# Patient Record
Sex: FEMALE | Race: ASIAN | NOT HISPANIC OR LATINO | Employment: OTHER | URBAN - METROPOLITAN AREA
[De-identification: names, ages, dates, MRNs, and addresses within clinical notes are randomized per-mention and may not be internally consistent; named-entity substitution may affect disease eponyms.]

---

## 2023-03-27 DIAGNOSIS — M19.09 PRIMARY OSTEOARTHRITIS OF OTHER SITE: Primary | ICD-10-CM

## 2023-03-27 RX ORDER — IBUPROFEN 800 MG/1
TABLET ORAL
Qty: 90 TABLET | Refills: 0 | Status: SHIPPED | OUTPATIENT
Start: 2023-03-27 | End: 2023-09-05

## 2023-04-11 ENCOUNTER — HOSPITAL ENCOUNTER (OUTPATIENT)
Dept: DATA CONVERSION | Facility: HOSPITAL | Age: 73
End: 2023-04-11
Attending: OPHTHALMOLOGY | Admitting: OPHTHALMOLOGY
Payer: MEDICARE

## 2023-04-11 DIAGNOSIS — F41.9 ANXIETY DISORDER, UNSPECIFIED: ICD-10-CM

## 2023-04-11 DIAGNOSIS — Z85.850 PERSONAL HISTORY OF MALIGNANT NEOPLASM OF THYROID: ICD-10-CM

## 2023-04-11 DIAGNOSIS — M19.90 UNSPECIFIED OSTEOARTHRITIS, UNSPECIFIED SITE: ICD-10-CM

## 2023-04-11 DIAGNOSIS — H25.812 COMBINED FORMS OF AGE-RELATED CATARACT, LEFT EYE: ICD-10-CM

## 2023-04-11 DIAGNOSIS — Z87.891 PERSONAL HISTORY OF NICOTINE DEPENDENCE: ICD-10-CM

## 2023-04-11 DIAGNOSIS — R91.8 OTHER NONSPECIFIC ABNORMAL FINDING OF LUNG FIELD: ICD-10-CM

## 2023-04-11 DIAGNOSIS — F32.A DEPRESSION, UNSPECIFIED: ICD-10-CM

## 2023-04-11 DIAGNOSIS — Z90.710 ACQUIRED ABSENCE OF BOTH CERVIX AND UTERUS: ICD-10-CM

## 2023-04-11 DIAGNOSIS — K21.9 GASTRO-ESOPHAGEAL REFLUX DISEASE WITHOUT ESOPHAGITIS: ICD-10-CM

## 2023-04-11 DIAGNOSIS — M81.0 AGE-RELATED OSTEOPOROSIS WITHOUT CURRENT PATHOLOGICAL FRACTURE: ICD-10-CM

## 2023-04-11 DIAGNOSIS — I10 ESSENTIAL (PRIMARY) HYPERTENSION: ICD-10-CM

## 2023-06-26 ENCOUNTER — OFFICE VISIT (OUTPATIENT)
Dept: PRIMARY CARE | Facility: CLINIC | Age: 73
End: 2023-06-26
Payer: MEDICARE

## 2023-06-26 ENCOUNTER — LAB (OUTPATIENT)
Dept: LAB | Facility: LAB | Age: 73
End: 2023-06-26
Payer: MEDICARE

## 2023-06-26 VITALS
RESPIRATION RATE: 18 BRPM | BODY MASS INDEX: 26.15 KG/M2 | OXYGEN SATURATION: 98 % | WEIGHT: 147.6 LBS | SYSTOLIC BLOOD PRESSURE: 136 MMHG | DIASTOLIC BLOOD PRESSURE: 82 MMHG | HEIGHT: 63 IN | HEART RATE: 65 BPM

## 2023-06-26 DIAGNOSIS — E55.9 HYPOVITAMINOSIS D: ICD-10-CM

## 2023-06-26 DIAGNOSIS — E04.0 GOITER DIFFUSE, NONTOXIC: Primary | ICD-10-CM

## 2023-06-26 DIAGNOSIS — E04.2 NONTOXIC MULTINODULAR GOITER: ICD-10-CM

## 2023-06-26 DIAGNOSIS — F32.A DEPRESSION, UNSPECIFIED DEPRESSION TYPE: ICD-10-CM

## 2023-06-26 DIAGNOSIS — E04.0 GOITER DIFFUSE, NONTOXIC: ICD-10-CM

## 2023-06-26 PROBLEM — F41.9 ANXIETY: Status: ACTIVE | Noted: 2023-06-26

## 2023-06-26 PROBLEM — H60.543 ECZEMA OF BOTH EXTERNAL EARS: Status: RESOLVED | Noted: 2023-06-26 | Resolved: 2023-06-26

## 2023-06-26 PROBLEM — Z96.1 PSEUDOPHAKIA: Status: RESOLVED | Noted: 2023-06-26 | Resolved: 2023-06-26

## 2023-06-26 PROBLEM — M47.818 ARTHROPATHY OF RIGHT SACROILIAC JOINT: Status: ACTIVE | Noted: 2023-06-26

## 2023-06-26 PROBLEM — C73 THYROID CANCER (MULTI): Status: RESOLVED | Noted: 2023-06-26 | Resolved: 2023-06-26

## 2023-06-26 PROBLEM — J31.0 RHINITIS: Status: RESOLVED | Noted: 2023-06-26 | Resolved: 2023-06-26

## 2023-06-26 PROBLEM — R91.8 LUNG NODULES: Status: ACTIVE | Noted: 2023-06-26

## 2023-06-26 PROBLEM — R20.0 RIGHT LEG NUMBNESS: Status: RESOLVED | Noted: 2023-06-26 | Resolved: 2023-06-26

## 2023-06-26 PROBLEM — B00.1 RECURRENT COLD SORES: Status: RESOLVED | Noted: 2023-06-26 | Resolved: 2023-06-26

## 2023-06-26 PROBLEM — M81.0 OSTEOPOROSIS: Status: ACTIVE | Noted: 2023-06-26

## 2023-06-26 PROBLEM — R73.9 STRESS HYPERGLYCEMIA: Status: RESOLVED | Noted: 2023-06-26 | Resolved: 2023-06-26

## 2023-06-26 PROBLEM — H25.813 COMBINED FORM OF AGE-RELATED CATARACT, BOTH EYES: Status: ACTIVE | Noted: 2023-06-26

## 2023-06-26 PROBLEM — K86.2 PANCREATIC CYST (HHS-HCC): Status: ACTIVE | Noted: 2023-06-26

## 2023-06-26 PROBLEM — R03.0 ELEVATED BP WITHOUT DIAGNOSIS OF HYPERTENSION: Status: RESOLVED | Noted: 2023-06-26 | Resolved: 2023-06-26

## 2023-06-26 PROBLEM — R13.14 DYSPHAGIA, PHARYNGOESOPHAGEAL PHASE: Status: RESOLVED | Noted: 2023-06-26 | Resolved: 2023-06-26

## 2023-06-26 PROBLEM — I78.1 SPIDER VEINS: Status: ACTIVE | Noted: 2023-06-26

## 2023-06-26 PROBLEM — H90.3 ASYMMETRIC SNHL (SENSORINEURAL HEARING LOSS): Status: ACTIVE | Noted: 2023-06-26

## 2023-06-26 PROBLEM — M25.512 LEFT SHOULDER PAIN: Status: RESOLVED | Noted: 2023-06-26 | Resolved: 2023-06-26

## 2023-06-26 PROBLEM — M51.36 DISC DEGENERATION, LUMBAR: Status: ACTIVE | Noted: 2023-06-26

## 2023-06-26 PROBLEM — I83.893 VARICOSE VEINS OF BOTH LOWER EXTREMITIES WITH COMPLICATIONS: Status: ACTIVE | Noted: 2023-06-26

## 2023-06-26 PROBLEM — K59.09 CHRONIC CONSTIPATION: Status: ACTIVE | Noted: 2023-06-26

## 2023-06-26 PROBLEM — M51.369 DISC DEGENERATION, LUMBAR: Status: ACTIVE | Noted: 2023-06-26

## 2023-06-26 PROBLEM — M15.9 GENERALIZED OSTEOARTHRITIS: Status: ACTIVE | Noted: 2023-06-26

## 2023-06-26 PROBLEM — M54.16 LUMBAR RADICULITIS: Status: RESOLVED | Noted: 2023-06-26 | Resolved: 2023-06-26

## 2023-06-26 PROBLEM — D18.03 LIVER HEMANGIOMA: Status: ACTIVE | Noted: 2023-06-26

## 2023-06-26 PROBLEM — M16.11 PRIMARY OSTEOARTHRITIS OF RIGHT HIP: Status: RESOLVED | Noted: 2023-06-26 | Resolved: 2023-06-26

## 2023-06-26 PROBLEM — E04.1 THYROID NODULE: Status: RESOLVED | Noted: 2023-06-26 | Resolved: 2023-06-26

## 2023-06-26 PROBLEM — R25.1 TREMOR: Status: ACTIVE | Noted: 2023-06-26

## 2023-06-26 PROBLEM — R35.0 URINARY FREQUENCY: Status: RESOLVED | Noted: 2023-06-26 | Resolved: 2023-06-26

## 2023-06-26 PROBLEM — E78.5 DYSLIPIDEMIA: Status: ACTIVE | Noted: 2023-06-26

## 2023-06-26 PROBLEM — M16.9 OSTEOARTHRITIS OF HIP: Status: RESOLVED | Noted: 2023-06-26 | Resolved: 2023-06-26

## 2023-06-26 PROBLEM — C73 PAPILLARY CARCINOMA OF THYROID (MULTI): Status: RESOLVED | Noted: 2023-06-26 | Resolved: 2023-06-26

## 2023-06-26 LAB
ERYTHROCYTE DISTRIBUTION WIDTH (RATIO) BY AUTOMATED COUNT: 11.9 % (ref 11.5–14.5)
ERYTHROCYTE MEAN CORPUSCULAR HEMOGLOBIN CONCENTRATION (G/DL) BY AUTOMATED: 32.6 G/DL (ref 32–36)
ERYTHROCYTE MEAN CORPUSCULAR VOLUME (FL) BY AUTOMATED COUNT: 91 FL (ref 80–100)
ERYTHROCYTES (10*6/UL) IN BLOOD BY AUTOMATED COUNT: 4.46 X10E12/L (ref 4–5.2)
HEMATOCRIT (%) IN BLOOD BY AUTOMATED COUNT: 40.8 % (ref 36–46)
HEMOGLOBIN (G/DL) IN BLOOD: 13.3 G/DL (ref 12–16)
LEUKOCYTES (10*3/UL) IN BLOOD BY AUTOMATED COUNT: 4.8 X10E9/L (ref 4.4–11.3)
PLATELETS (10*3/UL) IN BLOOD AUTOMATED COUNT: 246 X10E9/L (ref 150–450)

## 2023-06-26 PROCEDURE — 36415 COLL VENOUS BLD VENIPUNCTURE: CPT

## 2023-06-26 PROCEDURE — 1036F TOBACCO NON-USER: CPT | Performed by: NURSE PRACTITIONER

## 2023-06-26 PROCEDURE — 82607 VITAMIN B-12: CPT

## 2023-06-26 PROCEDURE — 1160F RVW MEDS BY RX/DR IN RCRD: CPT | Performed by: NURSE PRACTITIONER

## 2023-06-26 PROCEDURE — 85027 COMPLETE CBC AUTOMATED: CPT

## 2023-06-26 PROCEDURE — 99214 OFFICE O/P EST MOD 30 MIN: CPT | Performed by: NURSE PRACTITIONER

## 2023-06-26 PROCEDURE — 80053 COMPREHEN METABOLIC PANEL: CPT

## 2023-06-26 PROCEDURE — 82306 VITAMIN D 25 HYDROXY: CPT

## 2023-06-26 PROCEDURE — 1159F MED LIST DOCD IN RCRD: CPT | Performed by: NURSE PRACTITIONER

## 2023-06-26 PROCEDURE — 84443 ASSAY THYROID STIM HORMONE: CPT

## 2023-06-26 RX ORDER — DENOSUMAB 60 MG/ML
60 INJECTION SUBCUTANEOUS
COMMUNITY
Start: 2021-05-12

## 2023-06-26 RX ORDER — HYDROXYZINE HYDROCHLORIDE 25 MG/1
25 TABLET, FILM COATED ORAL 4 TIMES DAILY PRN
COMMUNITY
End: 2023-06-29 | Stop reason: SDUPTHER

## 2023-06-26 RX ORDER — ASPIRIN 325 MG
50000 TABLET, DELAYED RELEASE (ENTERIC COATED) ORAL
COMMUNITY
End: 2024-01-26 | Stop reason: WASHOUT

## 2023-06-26 RX ORDER — FAMOTIDINE 20 MG/1
20 TABLET, FILM COATED ORAL AS NEEDED
COMMUNITY
Start: 2021-10-13 | End: 2023-06-29 | Stop reason: SDUPTHER

## 2023-06-26 RX ORDER — BUPROPION HYDROCHLORIDE 100 MG/1
100 TABLET, EXTENDED RELEASE ORAL DAILY
Qty: 30 TABLET | Refills: 2 | Status: SHIPPED | OUTPATIENT
Start: 2023-06-26 | End: 2024-02-01 | Stop reason: SDUPTHER

## 2023-06-26 NOTE — PATIENT INSTRUCTIONS
Have lab work completed - we will check your thyroid to see if that is contributing to your tiredness  Start wellbutrin once a day  Follow up in 2 months  Follow up with ENT

## 2023-06-26 NOTE — PROGRESS NOTES
"Subjective   Patient ID: Elma Celestin is a 73 y.o. female who presents for Follow-up (Patient in today with concerns of some numbness in both feet (left worse than right), as well as extreme fatigue she is not sure if it is her thyroid or something else and increased agitation. /*Also wants noted she is experiencing post nasal drip in the mornings*).    Presents for complaints of numbness in bilateral feet - has had right foot numbness but 2 months ago left foot became numb.  It has been 4-6 weeks and numbness has improved  Denies fall or injury  Had followed up with podiatry for her right foot - and was told since numbness was in both feet likely back pain.  Pain has improved    She has been experiencing fatigue over the past month.  She feels that she is more depressed over the past month as well.  She is not sleeping well due to her back pain    Following Dr. Arriaga for her osteoporosis - had Prolia in Feb         Review of Systems   All other systems reviewed and are negative.      Objective   /82   Pulse 65   Resp 18   Ht 1.588 m (5' 2.5\")   Wt 67 kg (147 lb 9.6 oz)   SpO2 98%   BMI 26.57 kg/m²     Physical Exam  Vitals and nursing note reviewed.   Constitutional:       Appearance: Normal appearance. She is normal weight.   HENT:      Head: Normocephalic and atraumatic.      Right Ear: Tympanic membrane, ear canal and external ear normal.      Left Ear: Tympanic membrane, ear canal and external ear normal.      Nose: Nose normal.      Mouth/Throat:      Mouth: Mucous membranes are moist.      Pharynx: Oropharynx is clear.   Eyes:      Extraocular Movements: Extraocular movements intact.      Conjunctiva/sclera: Conjunctivae normal.      Pupils: Pupils are equal, round, and reactive to light.   Neck:      Vascular: No carotid bruit.   Cardiovascular:      Rate and Rhythm: Normal rate and regular rhythm.      Pulses: Normal pulses.      Heart sounds: Normal heart sounds.   Pulmonary:      Effort: " Pulmonary effort is normal.      Breath sounds: Normal breath sounds.   Abdominal:      General: Bowel sounds are normal.   Musculoskeletal:      Cervical back: Normal range of motion and neck supple.   Lymphadenopathy:      Cervical: No cervical adenopathy.   Skin:     General: Skin is warm and dry.      Capillary Refill: Capillary refill takes less than 2 seconds.   Neurological:      General: No focal deficit present.      Mental Status: She is alert and oriented to person, place, and time. Mental status is at baseline.      Cranial Nerves: No cranial nerve deficit.      Motor: No weakness.         Assessment/Plan   Problem List Items Addressed This Visit       Depression     She has been feeling more down lately and would like to start Wellbutrin as she has done well on that in the past  Start Wellbutrin  Side effects discussed if bothersome please follow-up  Please follow-up in 6 to 8 weeks         Relevant Medications    buPROPion SR (Wellbutrin SR) 100 mg 12 hr tablet    Other Relevant Orders    CBC (Completed)    Comprehensive Metabolic Panel (Completed)    Vitamin B12 (Completed)    Vitamin D, Total (Completed)    RESOLVED: Goiter diffuse, nontoxic - Primary    Relevant Orders    TSH with reflex to Free T4 if abnormal (Completed)    Nontoxic multinodular goiter     TSH level  Could be causing some of her symptoms         Relevant Orders    TSH with reflex to Free T4 if abnormal (Completed)    Hypovitaminosis D     Vitamin D level         Relevant Orders    Vitamin D, Total (Completed)

## 2023-06-27 LAB
ALANINE AMINOTRANSFERASE (SGPT) (U/L) IN SER/PLAS: 17 U/L (ref 7–45)
ALBUMIN (G/DL) IN SER/PLAS: 4.1 G/DL (ref 3.4–5)
ALKALINE PHOSPHATASE (U/L) IN SER/PLAS: 44 U/L (ref 33–136)
ANION GAP IN SER/PLAS: 13 MMOL/L (ref 10–20)
ASPARTATE AMINOTRANSFERASE (SGOT) (U/L) IN SER/PLAS: 20 U/L (ref 9–39)
BILIRUBIN TOTAL (MG/DL) IN SER/PLAS: 0.6 MG/DL (ref 0–1.2)
CALCIDIOL (25 OH VITAMIN D3) (NG/ML) IN SER/PLAS: 54 NG/ML
CALCIUM (MG/DL) IN SER/PLAS: 8.6 MG/DL (ref 8.6–10.3)
CARBON DIOXIDE, TOTAL (MMOL/L) IN SER/PLAS: 24 MMOL/L (ref 21–32)
CHLORIDE (MMOL/L) IN SER/PLAS: 106 MMOL/L (ref 98–107)
COBALAMIN (VITAMIN B12) (PG/ML) IN SER/PLAS: 643 PG/ML (ref 211–911)
CREATININE (MG/DL) IN SER/PLAS: 0.86 MG/DL (ref 0.5–1.05)
GFR FEMALE: 71 ML/MIN/1.73M2
GLUCOSE (MG/DL) IN SER/PLAS: 87 MG/DL (ref 74–99)
POTASSIUM (MMOL/L) IN SER/PLAS: 4.4 MMOL/L (ref 3.5–5.3)
PROTEIN TOTAL: 6.6 G/DL (ref 6.4–8.2)
SODIUM (MMOL/L) IN SER/PLAS: 139 MMOL/L (ref 136–145)
THYROTROPIN (MIU/L) IN SER/PLAS BY DETECTION LIMIT <= 0.05 MIU/L: 0.92 MIU/L (ref 0.44–3.98)
UREA NITROGEN (MG/DL) IN SER/PLAS: 19 MG/DL (ref 6–23)

## 2023-06-29 DIAGNOSIS — F41.9 ANXIETY: ICD-10-CM

## 2023-06-29 DIAGNOSIS — K21.9 GASTROESOPHAGEAL REFLUX DISEASE, UNSPECIFIED WHETHER ESOPHAGITIS PRESENT: ICD-10-CM

## 2023-06-29 DIAGNOSIS — E55.9 HYPOVITAMINOSIS D: Primary | ICD-10-CM

## 2023-06-29 PROBLEM — E04.0 GOITER DIFFUSE, NONTOXIC: Status: RESOLVED | Noted: 2023-06-26 | Resolved: 2023-06-29

## 2023-06-29 RX ORDER — ERGOCALCIFEROL 1.25 MG/1
50000 CAPSULE ORAL
Qty: 12 CAPSULE | Refills: 0 | Status: SHIPPED | OUTPATIENT
Start: 2023-06-29 | End: 2023-09-21

## 2023-06-29 RX ORDER — FAMOTIDINE 20 MG/1
20 TABLET, FILM COATED ORAL AS NEEDED
Qty: 90 TABLET | Refills: 1 | Status: SHIPPED | OUTPATIENT
Start: 2023-06-29 | End: 2024-01-02

## 2023-06-29 RX ORDER — HYDROXYZINE HYDROCHLORIDE 25 MG/1
25 TABLET, FILM COATED ORAL 2 TIMES DAILY PRN
Qty: 60 TABLET | Refills: 1 | Status: SHIPPED | OUTPATIENT
Start: 2023-06-29 | End: 2024-02-29 | Stop reason: SDUPTHER

## 2023-06-29 NOTE — ASSESSMENT & PLAN NOTE
She has been feeling more down lately and would like to start Wellbutrin as she has done well on that in the past  Start Wellbutrin  Side effects discussed if bothersome please follow-up  Please follow-up in 6 to 8 weeks

## 2023-07-31 LAB
CREATININE (MG/DL) IN SER/PLAS: 0.73 MG/DL (ref 0.5–1.05)
GFR FEMALE: 86 ML/MIN/1.73M2
POC CALCIUM IONIZED (MMOL/L) IN BLOOD: 1.13 MMOL/L (ref 1.1–1.33)

## 2023-08-25 ENCOUNTER — HOSPITAL ENCOUNTER (OUTPATIENT)
Dept: DATA CONVERSION | Facility: HOSPITAL | Age: 73
Discharge: HOME | End: 2023-08-25
Payer: MEDICARE

## 2023-08-25 DIAGNOSIS — E04.1 NONTOXIC SINGLE THYROID NODULE: ICD-10-CM

## 2023-08-28 ENCOUNTER — APPOINTMENT (OUTPATIENT)
Dept: PRIMARY CARE | Facility: CLINIC | Age: 73
End: 2023-08-28
Payer: MEDICARE

## 2023-09-01 DIAGNOSIS — M19.09 PRIMARY OSTEOARTHRITIS OF OTHER SITE: ICD-10-CM

## 2023-09-05 RX ORDER — IBUPROFEN 800 MG/1
800 TABLET ORAL 3 TIMES DAILY
Qty: 270 TABLET | Refills: 0 | Status: SHIPPED | OUTPATIENT
Start: 2023-09-05

## 2023-09-14 NOTE — H&P
History of Present Illness:   History Present Illness:  Reason for surgery: Cataract, left eye   HPI:    Ms. Celestin is a 73 y/o woman with a hx of visually significant  cataract, left eye, presenting for cataract extraction with IOL placement, left eye.     Allergies:        Allergies:  ·  NKDA :   ·  Tape  - Adhesive, Bandaids, Paper: Rash    Home Medication Review:   Home Medications Reviewed: yes     Impression/Procedure:   ·  Impression and Planned Procedure: hx of visually significant cataract, left eye, presenting for cataract extraction with IOL placement, left eye       ERAS (Enhanced Recovery After Surgery):  ·  ERAS Patient: no       Physical Exam by System:    Constitutional: Well developed, no distress, alert  and cooperative   Respiratory/Thorax: Patent airways, good chest expansion,  thorax symmetric   Cardiovascular: Regular, rate and rhythm     Consent:   COVID-19 Consent:  ·  COVID-19 Risk Consent Surgeon has reviewed key risks related to the risk of erica COVID-19 and if they contract COVID-19 what the risks are.     Attestation:   Note Completion:  I am a:  Resident/Fellow   Attending Attestation I saw and evaluated the patient.  I personally obtained the key and critical portions of the history and physical exam or was physically present for key and  critical portions performed by the resident/fellow. I reviewed the resident/fellow?s documentation and discussed the patient with the resident/fellow.  I agree with the resident/fellow?s medical decision making as documented in the note.     I personally evaluated the patient on 11-Apr-2023         Electronic Signatures:  Jose Pittman)  (Signed 11-Apr-2023 11:48)   Authored: Note Completion   Co-Signer: History of Present Illness, Allergies, Home Medication Review, Impression/Procedure, ERAS, Physical Exam, Consent, Note Completion  Esperanza Mart (Resident))  (Signed 11-Apr-2023 10:32)   Authored: History of Present Illness,  Allergies, Home  Medication Review, Impression/Procedure, ERAS, Physical Exam, Consent, Note Completion      Last Updated: 11-Apr-2023 11:48 by Jose Pittman)

## 2023-09-18 ENCOUNTER — OFFICE VISIT (OUTPATIENT)
Dept: PRIMARY CARE | Facility: CLINIC | Age: 73
End: 2023-09-18
Payer: MEDICARE

## 2023-09-18 VITALS
DIASTOLIC BLOOD PRESSURE: 70 MMHG | WEIGHT: 147.6 LBS | HEART RATE: 84 BPM | OXYGEN SATURATION: 98 % | BODY MASS INDEX: 26.15 KG/M2 | RESPIRATION RATE: 18 BRPM | HEIGHT: 63 IN | SYSTOLIC BLOOD PRESSURE: 128 MMHG

## 2023-09-18 DIAGNOSIS — K86.2 PANCREATIC CYST (HHS-HCC): Primary | ICD-10-CM

## 2023-09-18 DIAGNOSIS — R42 DIZZINESS: ICD-10-CM

## 2023-09-18 DIAGNOSIS — J43.2 CENTRILOBULAR EMPHYSEMA (MULTI): ICD-10-CM

## 2023-09-18 PROCEDURE — 1159F MED LIST DOCD IN RCRD: CPT | Performed by: NURSE PRACTITIONER

## 2023-09-18 PROCEDURE — 1036F TOBACCO NON-USER: CPT | Performed by: NURSE PRACTITIONER

## 2023-09-18 PROCEDURE — 1160F RVW MEDS BY RX/DR IN RCRD: CPT | Performed by: NURSE PRACTITIONER

## 2023-09-18 PROCEDURE — 99213 OFFICE O/P EST LOW 20 MIN: CPT | Performed by: NURSE PRACTITIONER

## 2023-09-18 RX ORDER — FLUOROURACIL 50 MG/G
CREAM TOPICAL 2 TIMES DAILY
COMMUNITY
End: 2023-10-20 | Stop reason: ALTCHOICE

## 2023-09-18 NOTE — PROGRESS NOTES
"Subjective   Patient ID: Elma Celestin is a 73 y.o. female who presents for Follow-up (Patient in today for routine F/U, she would like to discuss dizziness when going from laying to standing. She has noticed this happening more frequently. Would also like to discuss ref. For GI. ).    Presents for follow up    She has been having intermittent dizziness. She will have dizziness when she is laying down and sits to get up.  She felt like the room was spinning for a few seconds to one minute and then will pass.  Has happened in the middle of the night when she gets up to urinate.  She states she does not have the issue during       Would like referral to GI.  She will have constipation 2-3 days between BM.  She then will have diarrhea and will have incontinence suddenly.  Will have liquid diarrhea  Spicy food will cause reflux   Has pancreatic cyst - 2018 followed up with GI but has not seen GI since then         Review of Systems   All other systems reviewed and are negative.      Objective   /70   Pulse 84   Resp 18   Ht 1.588 m (5' 2.5\")   Wt 67 kg (147 lb 9.6 oz)   SpO2 98%   BMI 26.57 kg/m²     Physical Exam  Vitals and nursing note reviewed.   Constitutional:       General: She is not in acute distress.     Appearance: Normal appearance. She is normal weight.   HENT:      Head: Normocephalic and atraumatic.      Right Ear: External ear normal.      Left Ear: External ear normal.      Nose: Nose normal.      Mouth/Throat:      Mouth: Mucous membranes are moist.      Pharynx: Oropharynx is clear.   Eyes:      Extraocular Movements: Extraocular movements intact.      Conjunctiva/sclera: Conjunctivae normal.      Pupils: Pupils are equal, round, and reactive to light.   Neck:      Vascular: No carotid bruit.   Cardiovascular:      Rate and Rhythm: Normal rate and regular rhythm.      Pulses: Normal pulses.      Heart sounds: Normal heart sounds.   Pulmonary:      Effort: Pulmonary effort is normal.      " Breath sounds: Normal breath sounds.   Abdominal:      General: Bowel sounds are normal.      Palpations: Abdomen is soft.   Musculoskeletal:      Cervical back: Normal range of motion and neck supple.   Lymphadenopathy:      Cervical: No cervical adenopathy.   Skin:     General: Skin is warm and dry.      Capillary Refill: Capillary refill takes less than 2 seconds.   Neurological:      Mental Status: She is alert and oriented to person, place, and time.   Psychiatric:         Mood and Affect: Mood normal.         Behavior: Behavior normal.         Thought Content: Thought content normal.         Judgment: Judgment normal.         Assessment/Plan   Problem List Items Addressed This Visit       Pancreatic cyst - Primary     GI referral         Relevant Orders    Referral to Gastroenterology    Dizziness     Discussed changing positions slowly when laying down  Red flags discussed  Happens only when going from laying to sitting         Centrilobular emphysema (CMS/HCC)     CT lung screening due in Dec  stable

## 2023-09-18 NOTE — PATIENT INSTRUCTIONS
Make sure to change positions slowly - when you are laying down and get up - get up slowly and sit on the edge of the bed for 10-20 seconds to make sure you don't get dizzy     I placed a referral for the GI

## 2023-09-20 PROBLEM — J43.2 CENTRILOBULAR EMPHYSEMA (MULTI): Status: ACTIVE | Noted: 2023-09-20

## 2023-09-20 PROBLEM — R42 DIZZINESS: Status: ACTIVE | Noted: 2023-09-20

## 2023-09-20 NOTE — ASSESSMENT & PLAN NOTE
Discussed changing positions slowly when laying down  Red flags discussed  Happens only when going from laying to sitting

## 2023-09-30 PROBLEM — L81.4 OTHER MELANIN HYPERPIGMENTATION: Status: ACTIVE | Noted: 2022-09-26

## 2023-09-30 PROBLEM — D22.5 MELANOCYTIC NEVI OF TRUNK: Status: ACTIVE | Noted: 2022-09-26

## 2023-09-30 PROBLEM — E66.3 OVERWEIGHT WITH BODY MASS INDEX (BMI) OF 26 TO 26.9 IN ADULT: Status: ACTIVE | Noted: 2023-09-30

## 2023-09-30 PROBLEM — M75.102 TEAR OF LEFT ROTATOR CUFF: Status: ACTIVE | Noted: 2023-09-30

## 2023-09-30 PROBLEM — I82.90 VENOUS THROMBOSIS: Status: ACTIVE | Noted: 2023-09-30

## 2023-09-30 PROBLEM — L90.5 SCAR CONDITION AND FIBROSIS OF SKIN: Status: ACTIVE | Noted: 2022-09-26

## 2023-09-30 PROBLEM — L82.0 INFLAMED SEBORRHEIC KERATOSIS: Status: ACTIVE | Noted: 2022-09-26

## 2023-09-30 PROBLEM — L81.1 CHLOASMA: Status: ACTIVE | Noted: 2022-09-26

## 2023-09-30 PROBLEM — K21.9 GASTROESOPHAGEAL REFLUX DISEASE: Status: ACTIVE | Noted: 2023-09-30

## 2023-09-30 PROBLEM — L98.8 RHYTIDOSIS FACIALIS: Status: ACTIVE | Noted: 2022-09-26

## 2023-09-30 PROBLEM — D22.60 MELANOCYTIC NEVI OF UNSPECIFIED UPPER LIMB, INCLUDING SHOULDER: Status: ACTIVE | Noted: 2022-09-26

## 2023-09-30 PROBLEM — L43.0 HYPERTROPHIC LICHEN PLANUS: Status: ACTIVE | Noted: 2022-09-26

## 2023-09-30 PROBLEM — D22.70 MELANOCYTIC NEVI OF UNSPECIFIED LOWER LIMB, INCLUDING HIP: Status: ACTIVE | Noted: 2022-09-26

## 2023-09-30 PROBLEM — D18.01 HEMANGIOMA OF SKIN AND SUBCUTANEOUS TISSUE: Status: ACTIVE | Noted: 2022-09-26

## 2023-09-30 RX ORDER — SERTRALINE HYDROCHLORIDE 50 MG/1
50 TABLET, FILM COATED ORAL DAILY
COMMUNITY
End: 2023-10-06 | Stop reason: SINTOL

## 2023-10-02 NOTE — OP NOTE
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 phacoemulsification with placement of intraocular lens, LEFT EYE    Anesthesia: No anesthesiologist associated with this case  Estimated Blood Loss: 0  Findings: cataract  Implants: 19.0 D SN60WF        Operative Report:   After informed consent and physical examination, the patient was brought to the OR where the patient was given 3 drops of 1/2% tetracaine in the operative eye.   Please see anesthesia record to note if any IV sedation was required.  The patient was then prepped using povidone-iodine and draped in the usual sterile fashion.  A drop of povidone-iodine was placed into the eye.    A lid speculum was placed.  A paracentesis port was placed 60 degrees to the left of the planned temporal incision location using the yellow MVR style blade.  0.5 cc of 1% non-preserved lidocaine/epinephrine was placed into the anterior chamber.      The anterior chamber was filled initially with Viscoat and then Provisc using the Arshinoff shell technique as described in the literature.  A half thickness limbal incision was made using the tip of the keratome.  The incision was extended into the anterior  chamber using the keratome in a temporal position creating a shelved incision.  The anterior capsular tear was initiated using the pre-bent cystotome.  A round continuous tear capsulorhexis was performed using a combination of the cystotome and the Utrata  forceps.  Balanced salt solution was used to perform hydrodissection. The nucleus was cracked and divided in to halves then quadrants using a variety of vertical and horizontal chopping techniques and removed using ultrasound power and vacuum.  The remaining  epinuclear material was removed using the phacoemulsification  handpiece .  The remaining cortical material was removed using the irrigation aspiration unit.  The capsular bag was filled with provisc.     A model [id number] was placed into the capsular bag using the preloaded injection system ([id number]).      The remaining viscoelastic and some cortical material were removed using the irrigation aspiration unit.  The wounds were hydrated with balanced salt solution and felt to be watertight. Moxifloxacin was instilled into the AC.  A drop of 1% prednisolone  acetate was placed onto the eye.       At the conclusion of the case, the wound was felt to be watertight, the pupil was round, the pseudophakos was centered, and the anterior chamber  was deep.                        Attestation:   Note Completion:  Attending Attestation I was present for the entire procedure         Electronic Signatures:  Jose Pittman)  (Signed 11-Apr-2023 11:46)   Authored: Post-Operative Note, Chart Review, Note Completion      Last Updated: 11-Apr-2023 11:46 by Jose Pittman)

## 2023-10-05 ENCOUNTER — TELEPHONE (OUTPATIENT)
Dept: PRIMARY CARE | Facility: CLINIC | Age: 73
End: 2023-10-05
Payer: MEDICARE

## 2023-10-05 NOTE — TELEPHONE ENCOUNTER
Patient called requesting refill of prescription strength Vitamin D. I called her back but no answer. Left her a detailed VM letting her know that usually after the prescription strength Vitamin D is completed it is recommended she start OTC Vitamin D 2000 units daily.

## 2023-10-06 ENCOUNTER — OFFICE VISIT (OUTPATIENT)
Dept: SURGICAL ONCOLOGY | Facility: CLINIC | Age: 73
End: 2023-10-06
Payer: MEDICARE

## 2023-10-06 VITALS
OXYGEN SATURATION: 97 % | SYSTOLIC BLOOD PRESSURE: 154 MMHG | BODY MASS INDEX: 26.67 KG/M2 | DIASTOLIC BLOOD PRESSURE: 82 MMHG | HEART RATE: 74 BPM | TEMPERATURE: 97.5 F | WEIGHT: 148.15 LBS

## 2023-10-06 DIAGNOSIS — K86.2 PANCREATIC CYST (HHS-HCC): Primary | ICD-10-CM

## 2023-10-06 PROCEDURE — 99214 OFFICE O/P EST MOD 30 MIN: CPT | Performed by: PHYSICIAN ASSISTANT

## 2023-10-06 PROCEDURE — 1159F MED LIST DOCD IN RCRD: CPT | Performed by: PHYSICIAN ASSISTANT

## 2023-10-06 PROCEDURE — 1125F AMNT PAIN NOTED PAIN PRSNT: CPT | Performed by: PHYSICIAN ASSISTANT

## 2023-10-06 PROCEDURE — 99204 OFFICE O/P NEW MOD 45 MIN: CPT | Performed by: PHYSICIAN ASSISTANT

## 2023-10-06 PROCEDURE — 1036F TOBACCO NON-USER: CPT | Performed by: PHYSICIAN ASSISTANT

## 2023-10-06 PROCEDURE — 1160F RVW MEDS BY RX/DR IN RCRD: CPT | Performed by: PHYSICIAN ASSISTANT

## 2023-10-06 ASSESSMENT — PAIN SCALES - GENERAL: PAINLEVEL: 6

## 2023-10-06 NOTE — PROGRESS NOTES
Subjective   Ms. Celestin is a 73-year-old female who is referred by Marleny Trimble CNP to discuss a pancreatic cyst.    She had a renal ultrasound in 2018 which demonstrated an incidental finding of an indeterminate liver lesion. She subsequently had an MRI Liver that proved this lesion to be a hemangioma. She was also found to have a 1.0 cm pancreatic body cyst.    She was told this would require surveillance but was lost to follow-up during the pandemic.     She denies a personal history of acute pancreatitis. She denies chronic abdominal pain, early satiety, jaundice or unintentional weight loss.     She has occasional pyrosis for which she takes famotidine as needed.     She recently had several episodes of fecal urgency with one episode of fecal incontinence. This has since resolved. She is up to date with screening colonoscopy.    Medical history and surgical history: Thyroid cancer s/p partial thyroidectomy, s/p bilateral total hip replacement, fibroids s/p hysterectomy.  Family history: Maternal uncle had gastric cancer. Mother had leukemia.   Social history: Former smoker. Occasional alcohol use. No illicits.    Objective     /82   Pulse 74   Temp 36.4 °C (97.5 °F) (Skin)   Wt 67.2 kg (148 lb 2.4 oz)   SpO2 97%   BMI 26.67 kg/m²      Physical Exam  General: in no acute distress, comfortable  Eyes: no pallor or scleral icterus  Ears, nose, throat: no oropharyngeal edema  Cardiovascular: normal rate, regular rhythm  Respiratory: clear breath sounds, symmetric, no wheezes  Gastrointestinal: abdomen soft, non-tender, no masses  Musculoskeletal: normal gate, no deformities  Integumentary: no concerning lesions, no jaundice  Lymphatic: no abnormally palpable lymph nodes  Neurologic: no gross deficits  Psychiatric: cognition intact, mood appropriate    Assessment/Plan   Ms. Celestin is a 73-year-old female who is referred by Marleny Trimble CNP to discuss a pancreatic cyst.    PLAN: She has a 1.0 cm  pancreatic body cyst, identified on MRI from 2018. 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     I discussed that branch duct IPMNs represent potentially pre-malignant lesions and are managed based on the presence or absence of high risk stigmata or concerning features including cyst size, cyst growth over time, enhancing mural nodule or main duct dilatation. Management decisions are based on these features, as well as, personal medical history, family history, presence or absence of symptoms and patient preference.    She is due for surveillance. I have ordered an MRI Pancreas and will call her when I have the results. She will likely require annual surveillance, which I will manage.     I recommended that she trial a daily fiber supplement such as Benefiber.      Emily Chan PA-C

## 2023-10-06 NOTE — PROGRESS NOTES
Subjective   Ms. Celestin is a 73-year-old female who is referred by Marleny Trimble CNP to discuss a pancreatic cyst.    She had a renal ultrasound in 2018 that showed an incidental finding of an indeterminate liver lesion. She subsequently had an MRI Liver that proved this lesion to be a hemangioma. She was also found to have a 1.0 cm pancreatic body cyst.    She was told the cyst would require surveillance but was lost to follow-up during the pandemic.     She denies a personal history of acute pancreatitis. She denies chronic abdominal pain, early satiety, unintentional weight loss or jaundice.     She has occasional pyrosis for which she uses famotidine as needed.     She recently had several episodes of fecal urgency with one episode of fecal incontinence. This has since resolved. She is up to date with screening colonoscopy.    Medical and surgical history: Thyroid cancer s/p partial thyroidectomy, fibroids s/p hysterectomy, s/p bilateral hip replacement.  Family history: Maternal uncle had stomach cancer. Mother had leukemia.   Social history: Former smoker. Occasional alcohol use. No illicits.     Objective     There were no vitals taken for this visit.     Physical Exam  General: in no acute distress, comfortable  Eyes: no pallor or scleral icterus  Ears, nose, throat: no oropharyngeal edema  Cardiovascular: normal rate, regular rhythm  Respiratory: clear breath sounds, symmetric, no wheezes  Gastrointestinal: abdomen soft, non-tender, no masses  Musculoskeletal: normal gate, no deformities  Integumentary: no concerning lesions, no jaundice  Lymphatic: no abnormally palpable lymph nodes  Neurologic: no gross deficits  Psychiatric: cognition intact, mood appropriate    Assessment/Plan   Ms. Celestin is a 73-year-old female who is referred by Marleny Trimble CNP to discuss a pancreatic cyst.    PLAN: She had a 1.0 cm pancreatic body cyst identified on an MRI from 2018. 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     I  discussed that branch duct IPMNs represent potentially pre-malignant lesions and are managed based on the presence or absence of high risk stigmata or concerning features including cyst size, cyst growth over time, enhancing mural nodule or main duct dilatation. Management decisions are based on these features, as well as, personal medical history, family history, presence or absence of symptoms and patient preference.     I have ordered an MRI Pancreas and will call her once the results are available. She will likely require annual surveillance which I will manage.     For fecal urgency, I recommended that she trial a fiber supplement such as Benefiber.     Emily Chan PA-C

## 2023-10-20 ENCOUNTER — CLINICAL SUPPORT (OUTPATIENT)
Dept: AUDIOLOGY | Facility: CLINIC | Age: 73
End: 2023-10-20
Payer: MEDICARE

## 2023-10-20 ENCOUNTER — OFFICE VISIT (OUTPATIENT)
Dept: OTOLARYNGOLOGY | Facility: CLINIC | Age: 73
End: 2023-10-20
Payer: MEDICARE

## 2023-10-20 VITALS — TEMPERATURE: 97.3 F | HEIGHT: 63 IN | BODY MASS INDEX: 26.05 KG/M2 | WEIGHT: 147 LBS

## 2023-10-20 DIAGNOSIS — J34.2 DEVIATED NASAL SEPTUM: Primary | ICD-10-CM

## 2023-10-20 DIAGNOSIS — H90.3 SENSORINEURAL HEARING LOSS (SNHL) OF BOTH EARS: ICD-10-CM

## 2023-10-20 DIAGNOSIS — R42 DIZZINESS: Primary | ICD-10-CM

## 2023-10-20 DIAGNOSIS — J30.9 ALLERGIC RHINITIS, UNSPECIFIED SEASONALITY, UNSPECIFIED TRIGGER: ICD-10-CM

## 2023-10-20 DIAGNOSIS — R42 DIZZINESS AND GIDDINESS: ICD-10-CM

## 2023-10-20 DIAGNOSIS — C73 PAPILLARY THYROID CARCINOMA (MULTI): ICD-10-CM

## 2023-10-20 PROCEDURE — 1159F MED LIST DOCD IN RCRD: CPT | Performed by: OTOLARYNGOLOGY

## 2023-10-20 PROCEDURE — 1125F AMNT PAIN NOTED PAIN PRSNT: CPT | Performed by: OTOLARYNGOLOGY

## 2023-10-20 PROCEDURE — 92550 TYMPANOMETRY & REFLEX THRESH: CPT | Performed by: AUDIOLOGIST

## 2023-10-20 PROCEDURE — 1160F RVW MEDS BY RX/DR IN RCRD: CPT | Performed by: OTOLARYNGOLOGY

## 2023-10-20 PROCEDURE — 99214 OFFICE O/P EST MOD 30 MIN: CPT | Performed by: OTOLARYNGOLOGY

## 2023-10-20 PROCEDURE — 1036F TOBACCO NON-USER: CPT | Performed by: OTOLARYNGOLOGY

## 2023-10-20 PROCEDURE — 92557 COMPREHENSIVE HEARING TEST: CPT | Performed by: AUDIOLOGIST

## 2023-10-20 NOTE — PROGRESS NOTES
Chief Complaint   Patient presents with    Follow-up     Follow up    audio and follow up, when she sits up she feels dizzy for the last two months,  post nasal drip      HPI  Elma Celestin is a 73 y.o. female with several issues.  She is here for follow-up on papillary thyroid carcinoma.  Her repeat ultrasound August 2023 shows a stable left thyroid lobe with smaller nodules compared with 2022.  A second issue is 1 of classic BPPV symptoms that have been going on for about 6 months.  Finally she does have some nasal drainage and nasal obstruction.  Her audiogram today shows bilateral mild high-frequency sensorineural hearing loss  History:  with a remote history of a right papillary thyroid carcinoma status post right total thyroid lobectomy. The left thyroid has been followed with serial ultrasound. She has a 1.6 cm isoechoic thyroid nodule in addition to to stable thyroid nodules noted on previous ultrasound. Fine-needle aspiration April 2022 showed benign cells       Past Medical History:   Diagnosis Date    Aftercare following joint replacement surgery 09/13/2019    Orthopedic aftercare for joint replacement    Anesthesia of skin 06/02/2022    Numbness of extremity    Anxiety 06/26/2023    Arthritis     Cyst of pancreas 10/24/2018    Pancreatic cyst    Depression     Eczema of both external ears 06/26/2023    Elevated BP without diagnosis of hypertension 06/26/2023    Encounter for screening for malignant neoplasm of respiratory organs 09/25/2019    Encounter for screening for lung cancer    Epigastric pain 09/24/2018    Dyspepsia    GERD (gastroesophageal reflux disease)     Herpesviral vesicular dermatitis 04/15/2016    Recurrent cold sores    History of thyroid cancer     Hyperglycemia, unspecified 11/28/2022    Stress hyperglycemia    Left shoulder pain 06/26/2023    Local infection of the skin and subcutaneous tissue, unspecified 11/06/2017    Skin infection    Lumbar radiculitis 06/26/2023     Osteoarthritis of hip 06/26/2023    Other bursitis of hip, right hip 07/13/2020    Bursitis of right hip    Other conditions influencing health status 04/22/2019    Antibiotic prophylaxis for dental procedure indicated due to prior joint replacement    Other intervertebral disc displacement, lumbosacral region 12/29/2021    Displacement of lumbosacral intervertebral disc    Overweight 12/29/2021    Overweight with body mass index (BMI) of 27 to 27.9 in adult    Personal history of other benign neoplasm 10/24/2018    History of other benign neoplasm    Personal history of other diseases of the musculoskeletal system and connective tissue 11/24/2020    History of osteoporosis    Personal history of other mental and behavioral disorders 07/26/2018    History of depression    Personal history of other specified conditions 09/24/2018    History of urinary frequency    Personal history of other specified conditions 10/07/2020    History of multiple pulmonary nodules    Personal history of urinary (tract) infections     History of urinary tract infection    Presence of right artificial hip joint 03/18/2016    Status post right hip replacement    Primary osteoarthritis of right hip 06/26/2023    Pseudophakia 06/26/2023    Rash and other nonspecific skin eruption 09/25/2019    Skin rash    Rhinitis 06/26/2023    Right leg numbness 06/26/2023    Spondylosis without myelopathy or radiculopathy, sacral and sacrococcygeal region 05/04/2021    Arthropathy of right sacroiliac joint    Stress hyperglycemia 06/26/2023    Thyroid cancer (CMS/HCC) 06/26/2023    Thyroid nodule 06/26/2023    Toxic effect of venom of bees, accidental (unintentional), initial encounter 07/25/2016    Bee sting reaction, accidental or unintentional, initial encounter            Medications:     Current Outpatient Medications:     denosumab (Prolia) 60 mg/mL syringe, Inject 1 mL (60 mg) under the skin every 6 (six) months., Disp: , Rfl:     ibuprofen 800 mg  "tablet, Take 1 tablet (800 mg) by mouth 3 times a day., Disp: 270 tablet, Rfl: 0    buPROPion SR (Wellbutrin SR) 100 mg 12 hr tablet, Take 1 tablet (100 mg) by mouth once daily. Do not crush, chew, or split., Disp: 30 tablet, Rfl: 2    cholecalciferol (Vitamin D-3) 1,250 mcg (50,000 unit) capsule, Take 1 capsule (50,000 Units) by mouth 1 (one) time per week., Disp: , Rfl:     famotidine (Pepcid) 20 mg tablet, Take 1 tablet (20 mg) by mouth if needed for heartburn., Disp: 90 tablet, Rfl: 1    hydrOXYzine HCL (Atarax) 25 mg tablet, Take 1 tablet (25 mg) by mouth 2 times a day as needed for anxiety., Disp: 60 tablet, Rfl: 1     Allergies:  Allergies   Allergen Reactions    Latex, Natural Rubber Other    Meloxicam Other     nose bleeds    Adhesive Rash    Oxycodone-Acetaminophen Itching and Rash        Physical Exam:  Last Recorded Vitals  Temperature 36.3 °C (97.3 °F), height 1.6 m (5' 3\"), weight 66.7 kg (147 lb).  []General appearance: Well-developed, well-nourished in no acute distress, conversant with normal voice quality    Head/face: No erythema or edema or facial tenderness, and normal facial nerve function bilaterally    External ear: Clear external auditory canals with normal pinnae  Tube status: N/A  Middle ear: Tympanic membranes intact and mobile, middle ears normal.  Tympanic membrane perforation: N/A  Mastoid bowl: N/A  Hearing: Normal conversational awareness at normal speech thresholds    Nose visualized using: Anterior rhinoscopy  Nasal dorsum: Nontraumatic midline appearance  Septum: Severely deviated left  Inferior turbinates: Normal, pink  Secretions: Dry    Oral cavity and oropharynx: Normal  Teeth: Good condition  Floor of mouth: without lesions  Palate: Normal hard palate, soft palate and uvula  Oropharynx: Clear, no lesions present  Buccal mucosa: Normal without masses or lesions  Lips: Normal    Nasopharynx: Inadequate mirror exam secondary to gag/anatomy    Larynx and hypopharynx: Mirror " examination adequate and revealed epiglottis normal, vocal cord mobility normal, normal mucosa, false vocal cords normal, true vocal cords normal    Neck:  Salivary glands: Normal bilateral parotid and submandibular glands by inspection and palpation.  Non-thyroid masses: No palpable masses or significant lymphadenopathy  Trachea: Midline  Thyroid: Status post right thyroid lobectomy without palpable nodules   temporomandibular joint: Nontender  Cervical range of motion: Normal    Neurologic exam: Alert and oriented x3, appropriate affect.  Cranial nerves II-XII normal bilaterally  Extraocular movement: Extraocular movement intact, normal gaze alignment      Assessment/Plan   No diagnosis found.      I will have her see my audiologist for an Epley maneuver to treat what is likely BPPV.  If this is unsuccessful we will proceed to a VNG.  Her nasal symptoms are related to a deviated nasal septum and likely allergic rhinitis.  I have recommended Flonase and sinus irrigation.  Consider septoplasty.  Recheck thyroid ultrasound and TSH in 1 year    Curly Pulido MD

## 2023-10-20 NOTE — PROGRESS NOTES
AUDIOLOGY ADULT AUDIOMETRIC EVALUATION    Name:  Elma Celestin  :  1950  Age:  73 y.o.  Date of Evaluation:  2023    Reason for visit: Patient is seen in the clinic today at the request of Curly Pulido MD in otolaryngology for an audiologic evaluation.     HISTORY  The patient reported that she has been experiencing dizziness that appears to be positional for several months.  Occasional bilateral aural pressure was reported and otalgia, tinnitus and hearing loss were denied.    EVALUATION  See scanned audiogram: “Media” > “Audiology Report”.    RESULTS  Otoscopic Evaluation:  Right Ear: clear ear canal  Left Ear: clear ear canal    Immittance Measures:  Tympanometry:  Right Ear: Type A, normal tympanic membrane mobility with normal middle ear pressure   Left Ear: Type A, normal tympanic membrane mobility with normal middle ear pressure     Acoustic Reflexes:  Ipsilateral Right Ear: Present at normal levels at 500-2000 Hz, absent at 4000 Hz   Ipsilateral Left Ear: Absent at 500-4000 Hz   Contralateral Right Ear: did not evaluate  Contralateral Left Ear: did not evaluate    Distortion Product Otoacoustic Emissions (DPOAEs):  Right Ear: passed 7360-9523 Hz, absent at 750 and 7634-7953 Hz  Left Ear: passed 0107-9583 Hz, absent at 750 and 2723-7280 Hz    Audiometry:  Test Technique and Reliability:   Standard audiometry via supra-aural headphones. Reliability is good.    Pure tone air and bone conduction audiometry:  Right Ear: moderate sensorineural hearing loss at 9909-1904 Hz  Left Ear: mild sensorineural hearing loss at 0576-3325 Hz    Speech Audiometry (Word Recognition Scores):   Right Ear: Excellent, 100%   Left Ear: Excellent, 100%    IMPRESSIONS  The patient has a very slight high frequency hearing loss in both ears that does not interfere with her communication ability.    The presence of acoustic reflexes within normal intensity limits is consistent with normal middle ear and brainstem  function, and suggests that auditory sensitivity is not significantly impaired. An elevated or absent acoustic reflex threshold is consistent with a middle ear disorder, hearing loss in the stimulated ear, and/or interruption of neural innervation of the stapedius muscle. Present DPOAEs suggest normal/near normal cochlear outer hair cell function and are consistent with no greater than a mild hearing loss at those frequencies. Absent DPOAEs are consistent with abnormal cochlear outer hair cell function and some degree of hearing loss at those frequencies.    RECOMMENDATIONS  - Follow up with otolaryngology today as scheduled.  - Annual audiologic evaluation, sooner if an acute change is noted.    PATIENT EDUCATION  Discussed results, impressions and recommendations with the patient. Questions were addressed and the patient was encouraged to contact our office should concerns arise.    Time for this encounter: 10:00-10:30

## 2023-10-23 ENCOUNTER — CLINICAL SUPPORT (OUTPATIENT)
Dept: AUDIOLOGY | Facility: CLINIC | Age: 73
End: 2023-10-23
Payer: MEDICARE

## 2023-10-23 DIAGNOSIS — R42 DIZZINESS: Primary | ICD-10-CM

## 2023-10-23 PROCEDURE — 92542 POSITIONAL NYSTAGMUS TEST: CPT | Performed by: AUDIOLOGIST

## 2023-10-23 NOTE — PROGRESS NOTES
EVALUATION OF BENIGN POSITIONAL PAROXYSMAL VERTIGO (BPPV)    HISTORY:   Patient referred by Dr. Pulido for evaluation of possible BPPV.  Patient reports when she gets out of bed at night, she can get a brief dizziness, unsteadiness when walking.   She also reports when she rolls left she has experienced this brief sensation.    The last few days she has not had these symptoms.    She does report being off balanced and can often veer to the right.      EVALUATION: HALLPIKE HEAD RIGHT and LEFT were both negative for BPPV    RECOMMENDATIONS:   Patient negative for BPPV; per Dr. Pulido's note, proceeded with scheduling VNG (Videonystagmography) for further assessment.    Gave patient instructions and questionnaire.  Follow-up with Dr. Pulido post VNG.

## 2023-10-27 ENCOUNTER — HOSPITAL ENCOUNTER (OUTPATIENT)
Dept: RADIOLOGY | Facility: HOSPITAL | Age: 73
Discharge: HOME | End: 2023-10-27
Payer: MEDICARE

## 2023-10-27 ENCOUNTER — TELEPHONE (OUTPATIENT)
Dept: HEMATOLOGY/ONCOLOGY | Facility: CLINIC | Age: 73
End: 2023-10-27
Payer: MEDICARE

## 2023-10-27 DIAGNOSIS — K86.2 PANCREATIC CYST (HHS-HCC): ICD-10-CM

## 2023-10-27 PROCEDURE — 74183 MRI ABD W/O CNTR FLWD CNTR: CPT | Mod: MG

## 2023-10-27 PROCEDURE — 2550000001 HC RX 255 CONTRASTS: Performed by: PHYSICIAN ASSISTANT

## 2023-10-27 PROCEDURE — A9575 INJ GADOTERATE MEGLUMI 0.1ML: HCPCS | Performed by: PHYSICIAN ASSISTANT

## 2023-10-27 RX ORDER — GADOTERATE MEGLUMINE 376.9 MG/ML
15 INJECTION INTRAVENOUS
Status: COMPLETED | OUTPATIENT
Start: 2023-10-27 | End: 2023-10-27

## 2023-10-27 RX ADMIN — GADOTERATE MEGLUMINE 14 ML: 376.9 INJECTION INTRAVENOUS at 12:01

## 2023-10-27 NOTE — TELEPHONE ENCOUNTER
Called Ms. Celestin to review MRI results. I left a voicemail asking her to call back to discuss. Emily Chan PA-C.

## 2023-11-06 ENCOUNTER — APPOINTMENT (OUTPATIENT)
Dept: AUDIOLOGY | Facility: CLINIC | Age: 73
End: 2023-11-06
Payer: MEDICARE

## 2023-11-18 ENCOUNTER — TELEPHONE (OUTPATIENT)
Dept: PRIMARY CARE | Facility: CLINIC | Age: 73
End: 2023-11-18
Payer: MEDICARE

## 2023-11-18 DIAGNOSIS — U07.1 COVID-19: Primary | ICD-10-CM

## 2023-11-18 RX ORDER — NIRMATRELVIR AND RITONAVIR 300-100 MG
3 KIT ORAL 2 TIMES DAILY
Qty: 30 TABLET | Refills: 0 | Status: SHIPPED | OUTPATIENT
Start: 2023-11-18 | End: 2023-11-23

## 2023-11-18 NOTE — PROGRESS NOTES
Pt called answering service to report Covid 19 infection. Symptoms started yesterday with cough, congestion and myalgia. She denies current fever, sob, cp. sick contacts of , who is also covid positive. Pt advised for quarantine for the next 4 days with masking for an additional 5. Red flag symptoms reviewed and Paxlovid sent.

## 2023-11-27 ENCOUNTER — APPOINTMENT (OUTPATIENT)
Dept: OPHTHALMOLOGY | Facility: CLINIC | Age: 73
End: 2023-11-27
Payer: MEDICARE

## 2023-12-04 ENCOUNTER — TELEPHONE (OUTPATIENT)
Dept: PRIMARY CARE | Facility: CLINIC | Age: 73
End: 2023-12-04
Payer: MEDICARE

## 2023-12-31 DIAGNOSIS — K21.9 GASTROESOPHAGEAL REFLUX DISEASE, UNSPECIFIED WHETHER ESOPHAGITIS PRESENT: ICD-10-CM

## 2024-01-02 RX ORDER — FAMOTIDINE 20 MG/1
TABLET, FILM COATED ORAL
Qty: 90 TABLET | Refills: 1 | Status: SHIPPED | OUTPATIENT
Start: 2024-01-02

## 2024-01-26 ENCOUNTER — OFFICE VISIT (OUTPATIENT)
Dept: DERMATOLOGY | Facility: CLINIC | Age: 74
End: 2024-01-26
Payer: MEDICARE

## 2024-01-26 DIAGNOSIS — L81.1 MELASMA: Primary | ICD-10-CM

## 2024-01-26 DIAGNOSIS — L81.0 POST-INFLAMMATORY HYPERPIGMENTATION: ICD-10-CM

## 2024-01-26 PROCEDURE — 1125F AMNT PAIN NOTED PAIN PRSNT: CPT | Performed by: NURSE PRACTITIONER

## 2024-01-26 PROCEDURE — 1036F TOBACCO NON-USER: CPT | Performed by: NURSE PRACTITIONER

## 2024-01-26 PROCEDURE — 99213 OFFICE O/P EST LOW 20 MIN: CPT | Performed by: NURSE PRACTITIONER

## 2024-01-26 PROCEDURE — 1159F MED LIST DOCD IN RCRD: CPT | Performed by: NURSE PRACTITIONER

## 2024-01-26 PROCEDURE — 1160F RVW MEDS BY RX/DR IN RCRD: CPT | Performed by: NURSE PRACTITIONER

## 2024-01-26 ASSESSMENT — PROMIS GLOBAL HEALTH SCALE
RATE_SOCIAL_SATISFACTION: FAIR
RATE_MENTAL_HEALTH: FAIR
RATE_GENERAL_HEALTH: FAIR
CARRYOUT_SOCIAL_ACTIVITIES: FAIR
EMOTIONAL_PROBLEMS: OFTEN
RATE_AVERAGE_PAIN: 4
CARRYOUT_PHYSICAL_ACTIVITIES: MODERATELY
RATE_AVERAGE_FATIGUE: MODERATE
RATE_QUALITY_OF_LIFE: FAIR
RATE_PHYSICAL_HEALTH: FAIR

## 2024-01-26 NOTE — PROGRESS NOTES
Subjective     Elma Celestin is a 73 y.o. female who presents for the following: Skin Check.     Review of Systems:  No other skin or systemic complaints other than what is documented elsewhere in the note.    The following portions of the chart were reviewed this encounter and updated as appropriate:   Tobacco  Allergies  Meds  Problems  Med Hx  Surg Hx         Skin Cancer History  No skin cancer on file.      Specialty Problems          Dermatology Problems    Chloasma    Hemangioma of skin and subcutaneous tissue    Hypertrophic lichen planus    Inflamed seborrheic keratosis    Melanocytic nevi of trunk    Melanocytic nevi of unspecified lower limb, including hip    Melanocytic nevi of unspecified upper limb, including shoulder    Other melanin hyperpigmentation    Rhytidosis facialis    Scar condition and fibrosis of skin    Spider veins        Objective   Well appearing patient in no apparent distress; mood and affect are within normal limits.    A focused skin examination was performed waist up. All findings within normal limits unless otherwise noted below.    Assessment/Plan   1. Melasma  Left Zygomatic Area  2.5 x 3 cm tan to brown patch    Compared to baseline photo on patient's phone, majority of lesion has faded significantly at this time. Patient agrees about 50% better. She is tolerating fine at this time. Advised the treatment plan is to apply daily at bedtime for 8 weeks then break for 8 weeks then may repeat. Advised to do a 2 month on and 2 month off cycle. Patient verbalized understanding and agreement.     2. Post-inflammatory hyperpigmentation (2)  Left Thigh - Anterior, Right Thigh - Anterior  Scattered coupled tan brown 1-1.5 cm macules    Reports development of rash after starting Paxlovid for COVID infection in November 2024. Rash resolved 2 weeks after starting. Went to urgent care and was told she had EN. Denies subcutaneous nodules, just superficial. Based on exam hard to tell at  this time as she does have PIH macules. Advised those will fade with time. Reassured patient not likely EN given resolving after 2 weeks. Advised more likely viral rash with possible EM type presentation as some of the PIH macules almost have a targetoid appearance.         Return in September 2024 for routine skin check  or return to clinic sooner if needed

## 2024-01-31 RX ORDER — EPINEPHRINE 0.3 MG/.3ML
0.3 INJECTION SUBCUTANEOUS EVERY 5 MIN PRN
Status: CANCELLED | OUTPATIENT
Start: 2024-02-09

## 2024-01-31 RX ORDER — HEPARIN SODIUM,PORCINE/PF 10 UNIT/ML
50 SYRINGE (ML) INTRAVENOUS AS NEEDED
OUTPATIENT
Start: 2024-02-09

## 2024-01-31 RX ORDER — DIPHENHYDRAMINE HYDROCHLORIDE 50 MG/ML
50 INJECTION INTRAMUSCULAR; INTRAVENOUS AS NEEDED
Status: CANCELLED | OUTPATIENT
Start: 2024-02-09

## 2024-01-31 RX ORDER — ALBUTEROL SULFATE 0.83 MG/ML
3 SOLUTION RESPIRATORY (INHALATION) AS NEEDED
Status: CANCELLED | OUTPATIENT
Start: 2024-02-09

## 2024-01-31 RX ORDER — HEPARIN 100 UNIT/ML
500 SYRINGE INTRAVENOUS AS NEEDED
OUTPATIENT
Start: 2024-02-09

## 2024-01-31 RX ORDER — FAMOTIDINE 10 MG/ML
20 INJECTION INTRAVENOUS ONCE AS NEEDED
Status: CANCELLED | OUTPATIENT
Start: 2024-02-09

## 2024-02-01 ENCOUNTER — OFFICE VISIT (OUTPATIENT)
Dept: PRIMARY CARE | Facility: CLINIC | Age: 74
End: 2024-02-01
Payer: MEDICARE

## 2024-02-01 ENCOUNTER — LAB (OUTPATIENT)
Dept: LAB | Facility: LAB | Age: 74
End: 2024-02-01
Payer: MEDICARE

## 2024-02-01 VITALS
WEIGHT: 150 LBS | OXYGEN SATURATION: 95 % | HEART RATE: 79 BPM | DIASTOLIC BLOOD PRESSURE: 87 MMHG | BODY MASS INDEX: 26.58 KG/M2 | SYSTOLIC BLOOD PRESSURE: 138 MMHG | HEIGHT: 63 IN

## 2024-02-01 DIAGNOSIS — L65.9 HAIR THINNING: ICD-10-CM

## 2024-02-01 DIAGNOSIS — Z12.2 SCREENING FOR LUNG CANCER: ICD-10-CM

## 2024-02-01 DIAGNOSIS — Z13.220 LIPID SCREENING: ICD-10-CM

## 2024-02-01 DIAGNOSIS — F41.9 ANXIETY: ICD-10-CM

## 2024-02-01 DIAGNOSIS — M81.0 AGE RELATED OSTEOPOROSIS, UNSPECIFIED PATHOLOGICAL FRACTURE PRESENCE: ICD-10-CM

## 2024-02-01 DIAGNOSIS — G93.31 POSTVIRAL FATIGUE SYNDROME: ICD-10-CM

## 2024-02-01 DIAGNOSIS — R91.8 LUNG NODULES: Primary | ICD-10-CM

## 2024-02-01 DIAGNOSIS — F32.A DEPRESSION, UNSPECIFIED DEPRESSION TYPE: ICD-10-CM

## 2024-02-01 LAB
ALBUMIN SERPL BCP-MCNC: 4.1 G/DL (ref 3.4–5)
ALP SERPL-CCNC: 52 U/L (ref 33–136)
ALT SERPL W P-5'-P-CCNC: 16 U/L (ref 7–45)
ANION GAP SERPL CALC-SCNC: 11 MMOL/L (ref 10–20)
AST SERPL W P-5'-P-CCNC: 20 U/L (ref 9–39)
BASOPHILS # BLD AUTO: 0.1 X10*3/UL (ref 0–0.1)
BASOPHILS NFR BLD AUTO: 1.8 %
BILIRUB SERPL-MCNC: 0.5 MG/DL (ref 0–1.2)
BUN SERPL-MCNC: 15 MG/DL (ref 6–23)
CALCIUM SERPL-MCNC: 8.5 MG/DL (ref 8.6–10.3)
CHLORIDE SERPL-SCNC: 107 MMOL/L (ref 98–107)
CHOLEST SERPL-MCNC: 224 MG/DL (ref 0–199)
CHOLESTEROL/HDL RATIO: 3.3
CO2 SERPL-SCNC: 26 MMOL/L (ref 21–32)
CREAT SERPL-MCNC: 0.74 MG/DL (ref 0.5–1.05)
EGFRCR SERPLBLD CKD-EPI 2021: 85 ML/MIN/1.73M*2
EOSINOPHIL # BLD AUTO: 0.12 X10*3/UL (ref 0–0.4)
EOSINOPHIL NFR BLD AUTO: 2.1 %
ERYTHROCYTE [DISTWIDTH] IN BLOOD BY AUTOMATED COUNT: 12.2 % (ref 11.5–14.5)
GLUCOSE SERPL-MCNC: 104 MG/DL (ref 74–99)
HCT VFR BLD AUTO: 41.7 % (ref 36–46)
HDLC SERPL-MCNC: 68.6 MG/DL
HGB BLD-MCNC: 13.2 G/DL (ref 12–16)
IMM GRANULOCYTES # BLD AUTO: 0.01 X10*3/UL (ref 0–0.5)
IMM GRANULOCYTES NFR BLD AUTO: 0.2 % (ref 0–0.9)
LDLC SERPL CALC-MCNC: 125 MG/DL
LYMPHOCYTES # BLD AUTO: 1.84 X10*3/UL (ref 0.8–3)
LYMPHOCYTES NFR BLD AUTO: 32.7 %
MCH RBC QN AUTO: 29.8 PG (ref 26–34)
MCHC RBC AUTO-ENTMCNC: 31.7 G/DL (ref 32–36)
MCV RBC AUTO: 94 FL (ref 80–100)
MONOCYTES # BLD AUTO: 0.34 X10*3/UL (ref 0.05–0.8)
MONOCYTES NFR BLD AUTO: 6 %
NEUTROPHILS # BLD AUTO: 3.22 X10*3/UL (ref 1.6–5.5)
NEUTROPHILS NFR BLD AUTO: 57.2 %
NON HDL CHOLESTEROL: 155 MG/DL (ref 0–149)
NRBC BLD-RTO: 0 /100 WBCS (ref 0–0)
PLATELET # BLD AUTO: 255 X10*3/UL (ref 150–450)
POTASSIUM SERPL-SCNC: 4.1 MMOL/L (ref 3.5–5.3)
PROT SERPL-MCNC: 6.8 G/DL (ref 6.4–8.2)
RBC # BLD AUTO: 4.43 X10*6/UL (ref 4–5.2)
SODIUM SERPL-SCNC: 140 MMOL/L (ref 136–145)
TRIGL SERPL-MCNC: 153 MG/DL (ref 0–149)
TSH SERPL-ACNC: 1.18 MIU/L (ref 0.44–3.98)
VLDL: 31 MG/DL (ref 0–40)
WBC # BLD AUTO: 5.6 X10*3/UL (ref 4.4–11.3)

## 2024-02-01 PROCEDURE — 99214 OFFICE O/P EST MOD 30 MIN: CPT | Performed by: NURSE PRACTITIONER

## 2024-02-01 PROCEDURE — 80061 LIPID PANEL: CPT

## 2024-02-01 PROCEDURE — 1036F TOBACCO NON-USER: CPT | Performed by: NURSE PRACTITIONER

## 2024-02-01 PROCEDURE — 84443 ASSAY THYROID STIM HORMONE: CPT

## 2024-02-01 PROCEDURE — 1159F MED LIST DOCD IN RCRD: CPT | Performed by: NURSE PRACTITIONER

## 2024-02-01 PROCEDURE — 1160F RVW MEDS BY RX/DR IN RCRD: CPT | Performed by: NURSE PRACTITIONER

## 2024-02-01 PROCEDURE — 1125F AMNT PAIN NOTED PAIN PRSNT: CPT | Performed by: NURSE PRACTITIONER

## 2024-02-01 PROCEDURE — 36415 COLL VENOUS BLD VENIPUNCTURE: CPT

## 2024-02-01 PROCEDURE — 85025 COMPLETE CBC W/AUTO DIFF WBC: CPT

## 2024-02-01 PROCEDURE — 80053 COMPREHEN METABOLIC PANEL: CPT

## 2024-02-01 RX ORDER — BUPROPION HYDROCHLORIDE 100 MG/1
100 TABLET, EXTENDED RELEASE ORAL DAILY
Qty: 30 TABLET | Refills: 2 | Status: SHIPPED | OUTPATIENT
Start: 2024-02-01 | End: 2024-05-01

## 2024-02-01 NOTE — PROGRESS NOTES
"Subjective   Patient ID: Elma Celestin is a 73 y.o. female who presents for routine follow-up she has some concerns that she wants to discuss.    73-year-old female here to discuss a couple of concerns.  Her PCP is currently out of office and I am covering.  Patient states she is due for her annual wellness visits.  She has a history of lung nodules that are monitored every year she is due for her CAT scan of the lungs low-dose; ongoing fatigue; chronic anxiety and depression she was on Wellbutrin for 3 months she took herself off but she states that she feels very anxious at times difficult time sleeping she wakes up feeling very anxious with racing heart.  Due for her mammogram and all of her routine testing including blood work.  Has some on and off symptoms GI system but come and go stools.  Had COVID November 2023 she was given Paxlovid and then developed a rash on her skin she saw dermatology for the thought that it was viral related.  Patient thought it was reaction to the Paxlovid.  Ever since having COVID she is very tired, she is losing a lot of hair, she is just not feeling herself and having appetite issues.  She is due for her Medicare wellness visit and I explained to her that needs to be rescheduled with her primary care provider    2023 CT lung screening low-dose scan revealed:  1. A new nonspecific 2 mm ground-glass opacity in the right lower  lobe which is favored to represent a focus of atelectasis versus  mucous plug. Additional stable probable intrapulmonary lymph nodes in  the left lower lobe.  2. Mild upper lung predominant centrilobular emphysema.           Review of Systems    Objective   /87   Pulse 79   Ht 1.6 m (5' 3\")   Wt 68 kg (150 lb)   SpO2 95%   BMI 26.57 kg/m²     Physical Exam  Constitutional:       Appearance: Normal appearance.   Neck:      Vascular: No carotid bruit.      Comments: Thyroid is not palpable no nodules or lymph nodes appreciated  Cardiovascular:      " Rate and Rhythm: Normal rate and regular rhythm.   Pulmonary:      Effort: Pulmonary effort is normal.      Breath sounds: Normal breath sounds.   Musculoskeletal:         General: Normal range of motion.      Cervical back: Normal range of motion and neck supple.   Skin:     General: Skin is warm and dry.      Comments: Hair seems to be nice and full no visible thinning or alopecia   Neurological:      Mental Status: She is alert and oriented to person, place, and time.   Psychiatric:         Mood and Affect: Mood normal.         Assessment/Plan   Diagnoses and all orders for this visit:  Lung nodules  Comments:  Low-dose CT scan ordered  Depression, unspecified depression type  Comments:  Restart Wellbutrin  mg p.o. daily follow-up with PCP 1 month  Orders:  -     buPROPion SR (Wellbutrin SR) 100 mg 12 hr tablet; Take 1 tablet (100 mg) by mouth once daily. Do not crush, chew, or split.  Postviral fatigue syndrome  Comments:  Update blood work.  I explained that sometimes this just has to run its course  Orders:  -     CBC and Auto Differential; Future  -     Thyroid Stimulating Hormone; Future  Lipid screening  Comments:  Update fasting lipid panel  Orders:  -     Lipid Panel; Future  Hair thinning  Comments:  Stress can cause this.  Anxiety and vitamin deficiencies.  Will check B12.  Restart Wellbutrin.  COVID has been known to cause hair loss  Orders:  -     Thyroid Stimulating Hormone; Future  Anxiety  Comments:  Restart the Wellbutrin.  Other orders  -     Follow Up In Primary Care - Medicare Annual; Future    I asked that she follow-up in 1 month regarding the Wellbutrin for anxiety depression and she can also have her Medicare wellness exam done by her PCP

## 2024-02-01 NOTE — PATIENT INSTRUCTIONS
Restart your wellbutrin daily.   See Annelise in 1 month for follow up anxiety  You are due for your medicare wellness by your PCP

## 2024-02-05 NOTE — RESULT ENCOUNTER NOTE
Your calcium level was low and this needs to be corrected before you can get your Prolia injection.  Please get retested again the day before your injection.  Please confirm how much calcium you are taking on a regular basis

## 2024-02-06 ENCOUNTER — TELEPHONE (OUTPATIENT)
Dept: RHEUMATOLOGY | Facility: CLINIC | Age: 74
End: 2024-02-06
Payer: MEDICARE

## 2024-02-07 NOTE — TELEPHONE ENCOUNTER
Still ok to get it this week.  She can recheck her calcium the day before, an order is in the system

## 2024-02-08 ENCOUNTER — LAB (OUTPATIENT)
Dept: LAB | Facility: LAB | Age: 74
End: 2024-02-08
Payer: MEDICARE

## 2024-02-08 DIAGNOSIS — M81.0 AGE RELATED OSTEOPOROSIS, UNSPECIFIED PATHOLOGICAL FRACTURE PRESENCE: ICD-10-CM

## 2024-02-08 LAB
ALBUMIN SERPL BCP-MCNC: 4 G/DL (ref 3.4–5)
ALP SERPL-CCNC: 54 U/L (ref 33–136)
ALT SERPL W P-5'-P-CCNC: 15 U/L (ref 7–45)
ANION GAP SERPL CALC-SCNC: 10 MMOL/L (ref 10–20)
AST SERPL W P-5'-P-CCNC: 17 U/L (ref 9–39)
BILIRUB SERPL-MCNC: 0.5 MG/DL (ref 0–1.2)
BUN SERPL-MCNC: 16 MG/DL (ref 6–23)
CALCIUM SERPL-MCNC: 8.7 MG/DL (ref 8.6–10.3)
CHLORIDE SERPL-SCNC: 103 MMOL/L (ref 98–107)
CO2 SERPL-SCNC: 31 MMOL/L (ref 21–32)
CREAT SERPL-MCNC: 0.78 MG/DL (ref 0.5–1.05)
EGFRCR SERPLBLD CKD-EPI 2021: 80 ML/MIN/1.73M*2
GLUCOSE SERPL-MCNC: 108 MG/DL (ref 74–99)
POTASSIUM SERPL-SCNC: 4.3 MMOL/L (ref 3.5–5.3)
PROT SERPL-MCNC: 6.8 G/DL (ref 6.4–8.2)
SODIUM SERPL-SCNC: 140 MMOL/L (ref 136–145)

## 2024-02-08 PROCEDURE — 80053 COMPREHEN METABOLIC PANEL: CPT

## 2024-02-08 PROCEDURE — 36415 COLL VENOUS BLD VENIPUNCTURE: CPT

## 2024-02-09 ENCOUNTER — INFUSION (OUTPATIENT)
Dept: INFUSION THERAPY | Facility: CLINIC | Age: 74
End: 2024-02-09
Payer: MEDICARE

## 2024-02-09 VITALS
SYSTOLIC BLOOD PRESSURE: 132 MMHG | HEART RATE: 85 BPM | OXYGEN SATURATION: 97 % | RESPIRATION RATE: 18 BRPM | DIASTOLIC BLOOD PRESSURE: 73 MMHG | TEMPERATURE: 98.1 F

## 2024-02-09 DIAGNOSIS — M81.0 AGE RELATED OSTEOPOROSIS, UNSPECIFIED PATHOLOGICAL FRACTURE PRESENCE: Primary | ICD-10-CM

## 2024-02-09 NOTE — PROGRESS NOTES
[facility]   infusion Clinic Note   Date: 2024   Name: Elma Celestin  : 1950   MRN: [id number]         Reason for Visit: Injections (Prolia)         Visit Type: INJECTION       Ordered By: Lumapas      Accompanied by:Self      Diagnosis: Age related osteoporosis, unspecified pathological fracture presence       Allergies:   Allergies as of 2024 - Reviewed 2024   Allergen Reaction Noted    Latex, natural rubber Other 2018    Meloxicam Other 2014    Adhesive Rash 2023    Oxycodone-acetaminophen Itching and Rash 2014         Current Medications has a current medication list which includes the following prescription(s): bupropion sr, prolia, famotidine, hydroxyzine hcl, and ibuprofen.       Vitals:   There were no vitals filed for this visit.          Infusion Pre-procedure Checklist:   - Allergies reviewed: yes   - Medications reviewed: yes       - Previous reaction to current treatment: no      Assess patient for the concerns below. Document provider notification as appropriate.  - Active or recent infection with/without current antibiotic use: no  - Recent or planned invasive dental work: yes  - Recent or planned surgeries: no  - Recently received or plans to receive vaccinations: no  - Has treatment related toxicities: no  - Is pregnant:  no      Pain: 0   - Is the pain different from normal: no   - Is the pain tolerable: no   - Is your Doctor aware:  no      Labs: N/A         Fall Risk Screening:         Review Of Systems:  Review of Systems   All other systems reviewed and are negative.        Infusion Readiness:   - Assessment Concerns Related to Infusion: Yes  - Provider notified: YES      Document Below Only If Indicated:   New Patient Education:    N/A (returning patient for continuation of therapy. Ongoing education provided as needed.)        Treatment Conditions & Drug Specific Questions:    Denosumab  (PROLIA. XGEVA)    (Unless  otherwise specified on patient specific therapy plan):     TREATMENT CONDITIONS:  Unless otherwise specified on patient specific therapy plan HOLD and notify provider prior to proceeding with today's injection if patients:  o Calcium is LESS THAN 8.6 mg/dL OR  Ionized Calcium LESS THAN 1.1 mmol/L  o Recent or planned invasive dental procedure (within 4 weeks)    Lab Results   Component Value Date    CALCIUM 8.7 02/08/2024      Lab Results   Component Value Date    CAION 1.13 07/31/2023       Labs reviewed and patient meets treatment conditions? Yes    DRUG SPECIFIC QUESTIONS:  Is the patient taking calcium and vitamin D? Yes  (Recommended)    Pt Instructed on following risks: (1) hypocalcemia, (2) osteonecrosis of the jaw, (3) atypical femoral fractures, (4) serious infections, and (5) dermatologic reactions?  Yes      REMINDER:  PREGNANCY CATEGORY X DRUG. OBTAIN NEGTATIVE PREGNANCY TEST PRIOR TO FIRST INFUSION FOR WOMEN OF CHILDBEARING ABILITY   REMS DRUG    Recommended Vitals/Observation:  Vitals: Obtain vitals prior to injection.  Observation: Patient may leave immediately following injection.        Weight Based Drug Calculations:    WEIGHT BASED DRUGS: NOT APPLICABLE / FLAT DOSE          Initiated By: Soo Brunson RN   Time: 11:02 AM     Elma Celestin had no medications administered during this visit.  Pt unable to receive Prolia today due to recent root canal on Tuesday 2/6/24, and plans for more dental work on 2/23/24. Dr. Arriaga aware and wants her to wait 8weeks to get prolia.

## 2024-02-09 NOTE — PATIENT INSTRUCTIONS
Today :Elma Celestin had no medications administered during this visit.     For:   1. Age related osteoporosis, unspecified pathological fracture presence         Your next appointment is due in:  4/5/24        Please read the  Medication Guide that was given to you and reviewed during todays visit.     (Tell all doctors including dentists that you are taking this medication)     Go to the emergency room or call 911 if:  -You have signs of allergic reaction:   -Rash, hives, itching.   -Swollen, blistered, peeling skin.   -Swelling of face, lips, mouth, tongue or throat.   -Tightness of chest, trouble breathing, swallowing or talking     Call your doctor:  - If IV / injection site gets red, warm, swollen, itchy or leaks fluid or pus.     (Leave dressing on your IV site for at least 2 hours and keep area clean and dry  - If you get sick or have symptoms of infection or are not feeling well for any reason.    (Wash your hands often, stay away from people who are sick)  - If you have side effects from your medication that do not go away or are bothersome.     (Refer to the teaching your nurse gave you for side effects to call your doctor about)    - Common side effects may include:  stuffy nose, headache, feeling tired, muscle aches, upset stomach  - Before receiving any vaccines     - Call the [facility] at   If:  - You get sick, are on antibiotics, have had a recent vaccine, have surgery or dental work and your doctor wants your visit rescheduled.  - You need to cancel and reschedule your visit for any reason. Call at least 2 days before your visit if you need to cancel.   - Your insurance changes before your next visit.    (We will need to get approval from your new insurance. This can take up to two weeks.)     The [facility] is opened Monday thru Friday. We are closed on weekends and holidays.   Voice mail will take your call if the center is closed. If you leave a message please  allow 24 hours for a call back during weekdays. If you leave a message on a weekend/holiday, we will call you back the next business day.

## 2024-02-17 ENCOUNTER — APPOINTMENT (OUTPATIENT)
Dept: RADIOLOGY | Facility: HOSPITAL | Age: 74
End: 2024-02-17
Payer: MEDICARE

## 2024-02-18 ENCOUNTER — HOSPITAL ENCOUNTER (OUTPATIENT)
Dept: RADIOLOGY | Facility: HOSPITAL | Age: 74
Discharge: HOME | End: 2024-02-18
Payer: MEDICARE

## 2024-02-18 DIAGNOSIS — Z12.2 SCREENING FOR LUNG CANCER: ICD-10-CM

## 2024-02-18 DIAGNOSIS — R91.8 LUNG NODULES: ICD-10-CM

## 2024-02-18 PROCEDURE — 71271 CT THORAX LUNG CANCER SCR C-: CPT

## 2024-02-25 ASSESSMENT — EXTERNAL EXAM - LEFT EYE: OS_EXAM: NORMAL

## 2024-02-25 ASSESSMENT — EXTERNAL EXAM - RIGHT EYE: OD_EXAM: NORMAL

## 2024-02-25 ASSESSMENT — SLIT LAMP EXAM - LIDS
COMMENTS: GOOD POSITION
COMMENTS: GOOD POSITION

## 2024-02-26 NOTE — PROGRESS NOTES
RPE mottling, left eye  -OCT macula (2/27/24) - Normal thickness and contour OU. Outer retinal disruption nasal to fovea OS. No edema OU. 262/252.   -Unclear etiology, no history of eye trauma, sun gazing, laser pointer injury.   -Monitor for now. F/u 1 year for comprehensive exam with OCT macula.     Posterior capsular opacification, left eye  Pseudophakia  -s/p phacoemulsification/IOL OD 2/28/23 OS 4/11/23 ([facility])  -Good vision. Mild PCO OS, discussed exam finding with patient. Recommend observation for now and can consider YAG capsulotomy in the future if condition worsens.     Presbyopia  -Continue current glasses, may also use OTC reading glasses.      No FH of AMD/glaucoma

## 2024-02-27 ENCOUNTER — OFFICE VISIT (OUTPATIENT)
Dept: OPHTHALMOLOGY | Facility: CLINIC | Age: 74
End: 2024-02-27
Payer: MEDICARE

## 2024-02-27 DIAGNOSIS — H26.492 PCO (POSTERIOR CAPSULAR OPACIFICATION), LEFT: ICD-10-CM

## 2024-02-27 DIAGNOSIS — H52.4 PRESBYOPIA: ICD-10-CM

## 2024-02-27 DIAGNOSIS — Z96.1 PSEUDOPHAKIA: ICD-10-CM

## 2024-02-27 DIAGNOSIS — H35.89 RPE MOTTLING OF MACULA: Primary | ICD-10-CM

## 2024-02-27 PROCEDURE — 92134 CPTRZ OPH DX IMG PST SGM RTA: CPT | Performed by: OPHTHALMOLOGY

## 2024-02-27 PROCEDURE — 92015 DETERMINE REFRACTIVE STATE: CPT | Performed by: OPHTHALMOLOGY

## 2024-02-27 PROCEDURE — 92014 COMPRE OPH EXAM EST PT 1/>: CPT | Performed by: OPHTHALMOLOGY

## 2024-02-27 ASSESSMENT — REFRACTION_WEARINGRX
OD_AXIS: 165
OD_CYLINDER: -0.25
OS_CYLINDER: SPHERE
OD_SPHERE: +0.25
OS_SPHERE: PLANO
OS_ADD: +2.50
OD_ADD: +2.50

## 2024-02-27 ASSESSMENT — VISUAL ACUITY
OD_CC: 20/25+
OS_BAT_MED: 20/25
CORRECTION_TYPE: GLASSES
OS_CC: 20/25
METHOD: SNELLEN - LINEAR
OD_BAT_MED: 20/20

## 2024-02-27 ASSESSMENT — ENCOUNTER SYMPTOMS
ENDOCRINE NEGATIVE: 0
MUSCULOSKELETAL NEGATIVE: 0
HEMATOLOGIC/LYMPHATIC NEGATIVE: 0
ALLERGIC/IMMUNOLOGIC NEGATIVE: 0
RESPIRATORY NEGATIVE: 0
CARDIOVASCULAR NEGATIVE: 0
EYES NEGATIVE: 1
NEUROLOGICAL NEGATIVE: 0
PSYCHIATRIC NEGATIVE: 0
CONSTITUTIONAL NEGATIVE: 0
GASTROINTESTINAL NEGATIVE: 0

## 2024-02-27 ASSESSMENT — TONOMETRY
OD_IOP_MMHG: 16
IOP_METHOD: GOLDMANN APPLANATION
OS_IOP_MMHG: 16

## 2024-02-27 ASSESSMENT — REFRACTION_MANIFEST
OD_SPHERE: +0.25
OS_CYLINDER: SPHERE
OD_ADD: +2.50
OS_SPHERE: PLANO
OD_CYLINDER: -0.25
OS_ADD: +2.50
OD_AXIS: 120

## 2024-02-27 ASSESSMENT — CUP TO DISC RATIO
OS_RATIO: 0.3
OD_RATIO: 0.35

## 2024-02-29 ENCOUNTER — OFFICE VISIT (OUTPATIENT)
Dept: PRIMARY CARE | Facility: CLINIC | Age: 74
End: 2024-02-29
Payer: MEDICARE

## 2024-02-29 VITALS
RESPIRATION RATE: 18 BRPM | BODY MASS INDEX: 26.36 KG/M2 | WEIGHT: 148.8 LBS | HEIGHT: 63 IN | HEART RATE: 75 BPM | OXYGEN SATURATION: 98 %

## 2024-02-29 DIAGNOSIS — E04.2 NONTOXIC MULTINODULAR GOITER: ICD-10-CM

## 2024-02-29 DIAGNOSIS — Z00.00 ROUTINE GENERAL MEDICAL EXAMINATION AT HEALTH CARE FACILITY: Primary | ICD-10-CM

## 2024-02-29 DIAGNOSIS — J43.2 CENTRILOBULAR EMPHYSEMA (MULTI): ICD-10-CM

## 2024-02-29 DIAGNOSIS — F32.A DEPRESSION, UNSPECIFIED DEPRESSION TYPE: ICD-10-CM

## 2024-02-29 DIAGNOSIS — B37.9 YEAST INFECTION: ICD-10-CM

## 2024-02-29 DIAGNOSIS — F41.9 ANXIETY: ICD-10-CM

## 2024-02-29 DIAGNOSIS — R91.8 LUNG NODULES: ICD-10-CM

## 2024-02-29 DIAGNOSIS — C73 PAPILLARY THYROID CARCINOMA (MULTI): ICD-10-CM

## 2024-02-29 DIAGNOSIS — E78.5 DYSLIPIDEMIA: ICD-10-CM

## 2024-02-29 DIAGNOSIS — Z12.31 ENCOUNTER FOR SCREENING MAMMOGRAM FOR BREAST CANCER: ICD-10-CM

## 2024-02-29 PROBLEM — S80.212A ABRASION OF LEFT KNEE: Status: RESOLVED | Noted: 2024-02-29 | Resolved: 2024-02-29

## 2024-02-29 PROBLEM — R14.0 ABDOMINAL BLOATING: Status: RESOLVED | Noted: 2024-02-29 | Resolved: 2024-02-29
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
## 2024-02-29 PROBLEM — R05.9 COUGH: Status: RESOLVED | Noted: 2024-02-29 | Resolved: 2024-02-29

## 2024-02-29 PROBLEM — R04.0 EPISTAXIS: Status: RESOLVED | Noted: 2024-02-29 | Resolved: 2024-02-29

## 2024-02-29 PROBLEM — R42 DIZZINESS: Status: RESOLVED | Noted: 2023-09-20 | Resolved: 2024-02-29

## 2024-02-29 PROBLEM — M70.70 BURSITIS OF HIP: Status: RESOLVED | Noted: 2024-02-29 | Resolved: 2024-02-29

## 2024-02-29 PROBLEM — J20.9 ACUTE BRONCHITIS: Status: RESOLVED | Noted: 2024-02-29 | Resolved: 2024-02-29

## 2024-02-29 PROBLEM — R53.83 FATIGUE: Status: RESOLVED | Noted: 2024-02-29 | Resolved: 2024-02-29

## 2024-02-29 PROBLEM — R63.1 EXCESSIVE THIRST: Status: RESOLVED | Noted: 2024-02-29 | Resolved: 2024-02-29

## 2024-02-29 PROBLEM — I82.90 VENOUS THROMBOSIS: Status: RESOLVED | Noted: 2023-09-30 | Resolved: 2024-02-29

## 2024-02-29 PROBLEM — J06.9 ACUTE UPPER RESPIRATORY INFECTION: Status: RESOLVED | Noted: 2024-02-29 | Resolved: 2024-02-29

## 2024-02-29 PROBLEM — D64.9 ANEMIA: Status: RESOLVED | Noted: 2024-02-29 | Resolved: 2024-02-29

## 2024-02-29 PROCEDURE — 1036F TOBACCO NON-USER: CPT | Performed by: NURSE PRACTITIONER

## 2024-02-29 PROCEDURE — 99213 OFFICE O/P EST LOW 20 MIN: CPT | Performed by: NURSE PRACTITIONER

## 2024-02-29 PROCEDURE — 1159F MED LIST DOCD IN RCRD: CPT | Performed by: NURSE PRACTITIONER

## 2024-02-29 PROCEDURE — 1170F FXNL STATUS ASSESSED: CPT | Performed by: NURSE PRACTITIONER

## 2024-02-29 PROCEDURE — 1160F RVW MEDS BY RX/DR IN RCRD: CPT | Performed by: NURSE PRACTITIONER

## 2024-02-29 PROCEDURE — G0439 PPPS, SUBSEQ VISIT: HCPCS | Performed by: NURSE PRACTITIONER

## 2024-02-29 PROCEDURE — 1125F AMNT PAIN NOTED PAIN PRSNT: CPT | Performed by: NURSE PRACTITIONER

## 2024-02-29 RX ORDER — HYDROXYZINE HYDROCHLORIDE 10 MG/1
10 TABLET, FILM COATED ORAL 2 TIMES DAILY PRN
Qty: 60 TABLET | Refills: 2 | Status: SHIPPED | OUTPATIENT
Start: 2024-02-29 | End: 2024-05-29

## 2024-02-29 RX ORDER — FLUCONAZOLE 150 MG/1
150 TABLET ORAL ONCE
Qty: 2 TABLET | Refills: 1 | Status: SHIPPED | OUTPATIENT
Start: 2024-02-29 | End: 2024-02-29

## 2024-02-29 ASSESSMENT — ACTIVITIES OF DAILY LIVING (ADL)
DRESSING: INDEPENDENT
DOING_HOUSEWORK: INDEPENDENT
MANAGING_FINANCES: INDEPENDENT
BATHING: INDEPENDENT
GROCERY_SHOPPING: INDEPENDENT
TAKING_MEDICATION: INDEPENDENT

## 2024-02-29 ASSESSMENT — ENCOUNTER SYMPTOMS
LOSS OF SENSATION IN FEET: 0
DEPRESSION: 1
OCCASIONAL FEELINGS OF UNSTEADINESS: 0

## 2024-02-29 ASSESSMENT — PATIENT HEALTH QUESTIONNAIRE - PHQ9
2. FEELING DOWN, DEPRESSED OR HOPELESS: SEVERAL DAYS
SUM OF ALL RESPONSES TO PHQ9 QUESTIONS 1 AND 2: 2
1. LITTLE INTEREST OR PLEASURE IN DOING THINGS: SEVERAL DAYS
10. IF YOU CHECKED OFF ANY PROBLEMS, HOW DIFFICULT HAVE THESE PROBLEMS MADE IT FOR YOU TO DO YOUR WORK, TAKE CARE OF THINGS AT HOME, OR GET ALONG WITH OTHER PEOPLE: SOMEWHAT DIFFICULT

## 2024-02-29 NOTE — PATIENT INSTRUCTIONS
I decreased the hydroxyzine to 10 mg to see if that helps with the groggy feeling   I ordered your mammogram   I sent in diflucan to help with the yeast infection - you will need this whenever you take a penicillin      Ways to Help Prevent Falls at Home    Quick Tips   ? Ask for help if you need it. Most people want to help!   ? Get up slowly after sitting or laying down   ? Wear a medical alert device or keep cell phone in your pocket   ? Use night lights, especially areas near a bathroom   ? Keep the items you use often within reach on a small stool or end table   ? Use an assistive device such as walker or cane, as directed by provider/physical therapy   ? Use a non-slip mat and grab bars in your bathroom. Look for home health sections for best options     Other Areas to Focus On   ? Exercise and nutrition: Regular exercise or taking a falls prevention class are great ways improve strength and balance. Don’t forget to stay hydrated and bring a snack!   ? Medicine side effects: Some medicines can make you sleepy or dizzy, which could cause a fall. Ask your healthcare provider about the side effects your medicines could cause. Be sure to let them know if you take any vitamins or supplements as well.   ? Tripping hazards: Remove items you could trip on, such as loose mats, rugs, cords, and clutter. Wear closed toe shoes with rubber soles.   ? Health and wellness: Get regular checkups with your healthcare provider, plus routine vision and hearing screenings. Talk with your healthcare provider about:   o Your medicines and the possible side effects - bring them in a bag if that is easier!   o Problems with balance or feeling dizzy   o Ways to promote bone health, such as Vitamin D and calcium supplements   o Questions or concerns about falling     *Ask your healthcare team if you have questions     [facility], 2022

## 2024-02-29 NOTE — ASSESSMENT & PLAN NOTE
Stable  Hydroxyzine 25 mg at bedtime made her too groggy in the morning  Hydroxyzine 10 mg at bedtime as needed for anxiety

## 2024-02-29 NOTE — ASSESSMENT & PLAN NOTE
Due to antibiotic use  Stop Monistat  Diflucan 150 mg 1 tablet if no improvement in 3 days may repeat

## 2024-02-29 NOTE — PROGRESS NOTES
"Subjective   Reason for Visit: Elma Celestin is an 73 y.o. female here for a Medicare Wellness visit.     Past Medical, Surgical, and Family History reviewed and updated in chart.    Reviewed all medications by prescribing practitioner or clinical pharmacist (such as prescriptions, OTCs, herbal therapies and supplements) and documented in the medical record.    Presents for follow up and AMW    Due for mammogram  Colonoscopy 2019    She has had dental work over the past month.  She has had 2 courses of antibiotics and now she has yeast infection.  She has tried monistat for the past 4 days - but still having symptoms    Labs completed 3 weeks ago - reviewed with her  Following ENT - will be having thyroid ultrasound     Will take hydroxyzine to help sleep but when she does she feels groggy the next day.  Would like to know if can decrease the hydroxyzine         Patient Care Team:  SÁNCHEZ Mckeon as PCP - General  SÁNCHEZ Mckeon as PCP - [employer] PCP     Review of Systems   All other systems reviewed and are negative.      Objective   Vitals:  Pulse 75   Resp 18   Ht 1.6 m (5' 3\")   Wt 67.5 kg (148 lb 12.8 oz)   SpO2 98%   BMI 26.36 kg/m²       Physical Exam  Vitals and nursing note reviewed.   Constitutional:       General: She is not in acute distress.     Appearance: Normal appearance. She is normal weight.   HENT:      Head: Normocephalic and atraumatic.      Right Ear: Tympanic membrane, ear canal and external ear normal.      Left Ear: Tympanic membrane, ear canal and external ear normal.      Nose: Nose normal.      Mouth/Throat:      Mouth: Mucous membranes are moist.      Pharynx: Oropharynx is clear.   Eyes:      Extraocular Movements: Extraocular movements intact.      Conjunctiva/sclera: Conjunctivae normal.      Pupils: Pupils are equal, round, and reactive to light.   Neck:      Vascular: No carotid bruit.   Cardiovascular:      Rate " and Rhythm: Normal rate and regular rhythm.      Pulses: Normal pulses.      Heart sounds: Normal heart sounds.   Pulmonary:      Effort: Pulmonary effort is normal.      Breath sounds: Normal breath sounds.   Abdominal:      General: Bowel sounds are normal. There is no distension.      Palpations: Abdomen is soft.      Tenderness: There is no abdominal tenderness.   Musculoskeletal:         General: Normal range of motion.      Cervical back: Normal range of motion and neck supple.      Right lower leg: No edema.      Left lower leg: No edema.   Lymphadenopathy:      Cervical: No cervical adenopathy.   Skin:     General: Skin is warm and dry.      Capillary Refill: Capillary refill takes less than 2 seconds.   Neurological:      General: No focal deficit present.      Mental Status: She is alert and oriented to person, place, and time. Mental status is at baseline.      Motor: No weakness.   Psychiatric:         Mood and Affect: Mood normal.         Behavior: Behavior normal.         Thought Content: Thought content normal.         Judgment: Judgment normal.         Assessment/Plan   Problem List Items Addressed This Visit       Anxiety    Overview     Stable  Hydroxyzine 25 mg at bedtime made her too groggy in the morning  Hydroxyzine 10 mg at bedtime as needed for anxiety         Current Assessment & Plan     Stable  Hydroxyzine 25 mg at bedtime made her too groggy in the morning  Hydroxyzine 10 mg at bedtime as needed for anxiety         Relevant Medications    hydrOXYzine HCL (Atarax) 10 mg tablet    Depression    Overview     She has been feeling more down lately and would like to start Wellbutrin as she has done well on that in the past  Start Wellbutrin  Side effects discussed if bothersome please follow-up           Current Assessment & Plan     She has been feeling more down lately since having COVID in November and would like to start Wellbutrin as she has done well on that in the past.  She does have  prescription for Wellbutrin at home and she will decide if she would like to start it  Start Wellbutrin  Side effects discussed if bothersome please follow-up         Dyslipidemia    Overview     Lipid panel reviewed          Current Assessment & Plan     Lipid panel reviewed          Nontoxic multinodular goiter    Overview     TSH level  Following ENT  Will be having US of thyroid         Current Assessment & Plan     TSH level  Following ENT  Will be having US of thyroid         Lung nodules    Overview     Stable  CT lung low-dose follow-up has been stable repeat 1 year         Current Assessment & Plan     Stable  CT lung low-dose follow-up has been stable repeat 1 year         Papillary thyroid carcinoma (CMS/HCC)    Current Assessment & Plan     Stable  Following ENT  Will be having an ultrasound of thyroid         Centrilobular emphysema (CMS/HCC)    Overview     Stable  CT lung low-dose follow-up has been stable repeat 1 year         Current Assessment & Plan     Stable  CT lung low-dose follow-up has been stable repeat 1 year         Yeast infection    Overview     Due to antibiotic use  Stop Monistat  Diflucan 150 mg 1 tablet if no improvement in 3 days may repeat         Current Assessment & Plan     Due to antibiotic use  Stop Monistat  Diflucan 150 mg 1 tablet if no improvement in 3 days may repeat         Relevant Medications    fluconazole (Diflucan) 150 mg tablet     Other Visit Diagnoses       Routine general medical examination at health care facility    -  Primary    Encounter for screening mammogram for breast cancer        Relevant Orders    BI mammo bilateral screening tomosynthesis               Patient was identified as a fall risk. Risk prevention instructions provided.

## 2024-02-29 NOTE — ASSESSMENT & PLAN NOTE
She has been feeling more down lately since having COVID in November and would like to start Wellbutrin as she has done well on that in the past.  She does have prescription for Wellbutrin at home and she will decide if she would like to start it  Start Wellbutrin  Side effects discussed if bothersome please follow-up

## 2024-03-02 DIAGNOSIS — M81.0 AGE-RELATED OSTEOPOROSIS WITHOUT CURRENT PATHOLOGICAL FRACTURE: Primary | ICD-10-CM

## 2024-03-07 ENCOUNTER — APPOINTMENT (OUTPATIENT)
Dept: OPHTHALMOLOGY | Facility: CLINIC | Age: 74
End: 2024-03-07
Payer: MEDICARE

## 2024-03-14 ENCOUNTER — HOSPITAL ENCOUNTER (OUTPATIENT)
Dept: RADIOLOGY | Facility: HOSPITAL | Age: 74
Discharge: HOME | End: 2024-03-14
Payer: MEDICARE

## 2024-03-14 VITALS — HEIGHT: 63 IN | WEIGHT: 147 LBS | BODY MASS INDEX: 26.05 KG/M2

## 2024-03-14 DIAGNOSIS — Z12.31 ENCOUNTER FOR SCREENING MAMMOGRAM FOR BREAST CANCER: ICD-10-CM

## 2024-03-14 PROCEDURE — 77067 SCR MAMMO BI INCL CAD: CPT

## 2024-03-14 PROCEDURE — 77067 SCR MAMMO BI INCL CAD: CPT | Performed by: RADIOLOGY

## 2024-03-14 PROCEDURE — 77063 BREAST TOMOSYNTHESIS BI: CPT | Performed by: RADIOLOGY

## 2024-04-02 ENCOUNTER — LAB (OUTPATIENT)
Dept: LAB | Facility: LAB | Age: 74
End: 2024-04-02
Payer: MEDICARE

## 2024-04-02 DIAGNOSIS — M81.0 AGE-RELATED OSTEOPOROSIS WITHOUT CURRENT PATHOLOGICAL FRACTURE: ICD-10-CM

## 2024-04-02 LAB
CREAT SERPL-MCNC: 0.94 MG/DL (ref 0.5–1.05)
EGFRCR SERPLBLD CKD-EPI 2021: 64 ML/MIN/1.73M*2

## 2024-04-02 PROCEDURE — 82565 ASSAY OF CREATININE: CPT

## 2024-04-02 PROCEDURE — 36415 COLL VENOUS BLD VENIPUNCTURE: CPT

## 2024-04-02 PROCEDURE — 82330 ASSAY OF CALCIUM: CPT

## 2024-04-03 LAB — CA-I BLD-SCNC: 1.13 MMOL/L (ref 1.1–1.33)

## 2024-04-05 ENCOUNTER — INFUSION (OUTPATIENT)
Dept: INFUSION THERAPY | Facility: CLINIC | Age: 74
End: 2024-04-05
Payer: MEDICARE

## 2024-04-05 VITALS
TEMPERATURE: 97.9 F | HEART RATE: 78 BPM | SYSTOLIC BLOOD PRESSURE: 156 MMHG | DIASTOLIC BLOOD PRESSURE: 74 MMHG | RESPIRATION RATE: 18 BRPM | OXYGEN SATURATION: 98 %

## 2024-04-05 DIAGNOSIS — M81.0 AGE RELATED OSTEOPOROSIS, UNSPECIFIED PATHOLOGICAL FRACTURE PRESENCE: ICD-10-CM

## 2024-04-05 PROCEDURE — 96372 THER/PROPH/DIAG INJ SC/IM: CPT | Performed by: INTERNAL MEDICINE

## 2024-04-05 PROCEDURE — 2500000004 HC RX 250 GENERAL PHARMACY W/ HCPCS (ALT 636 FOR OP/ED): Mod: JZ | Performed by: INTERNAL MEDICINE

## 2024-04-05 RX ORDER — DIPHENHYDRAMINE HYDROCHLORIDE 50 MG/ML
50 INJECTION INTRAMUSCULAR; INTRAVENOUS AS NEEDED
OUTPATIENT
Start: 2024-07-30

## 2024-04-05 RX ORDER — ALBUTEROL SULFATE 0.83 MG/ML
3 SOLUTION RESPIRATORY (INHALATION) AS NEEDED
OUTPATIENT
Start: 2024-07-30

## 2024-04-05 RX ORDER — EPINEPHRINE 0.3 MG/.3ML
0.3 INJECTION SUBCUTANEOUS EVERY 5 MIN PRN
OUTPATIENT
Start: 2024-07-30

## 2024-04-05 RX ORDER — FAMOTIDINE 10 MG/ML
20 INJECTION INTRAVENOUS ONCE AS NEEDED
OUTPATIENT
Start: 2024-07-30

## 2024-04-05 RX ADMIN — DENOSUMAB 60 MG: 60 INJECTION SUBCUTANEOUS at 10:52

## 2024-04-05 ASSESSMENT — ENCOUNTER SYMPTOMS: EXTREMITY WEAKNESS: 1

## 2024-04-05 NOTE — PROGRESS NOTES
[facility]   Infusion Clinic Note   Date: 2024   Name: Elma Celestin  : 1950   MRN: [id number]         Reason for Visit: No chief complaint on file.         Visit Type: INJECTION       Ordered By: Fredas      Accompanied by:Self      Diagnosis: Age related osteoporosis, unspecified pathological fracture presence       Allergies:   Allergies as of 2024 - Reviewed 2024   Allergen Reaction Noted    Latex, natural rubber Other 2018    Meloxicam Other 2014    Adhesive Rash 2023    Oxycodone-acetaminophen Itching and Rash 2014         Current Medications has a current medication list which includes the following prescription(s): bupropion sr, prolia, famotidine, hydroxyzine hcl, and ibuprofen.       Vitals:   There were no vitals filed for this visit.          Infusion Pre-procedure Checklist:   - Allergies reviewed: yes   - Medications reviewed: yes       - Previous reaction to current treatment: no      Assess patient for the concerns below. Document provider notification as appropriate.  - Active or recent infection with/without current antibiotic use: no  - Recent or planned invasive dental work: no  - Recent or planned surgeries: no  - Recently received or plans to receive vaccinations: no  - Has treatment related toxicities: no  - Is pregnant:  no      Pain: 4   - Is the pain different from normal: yes   - Is the pain tolerable: no   - Is your Doctor aware:  no      Labs: N/A         Fall Risk Screening:         Review Of Systems:  Review of Systems   Neurological:  Positive for extremity weakness.         Infusion Readiness:   - Assessment Concerns Related to Infusion: No  - Provider notified: no      Document Below Only If Indicated:   New Patient Education:    N/A (returning patient for continuation of therapy. Ongoing education provided as needed.)        Treatment Conditions & Drug Specific Questions:    Denosumab  (PROLIA. XGEVA)     (Unless otherwise specified on patient specific therapy plan):     TREATMENT CONDITIONS:  Unless otherwise specified on patient specific therapy plan HOLD and notify provider prior to proceeding with today's injection if patients:  o Calcium is LESS THAN 8.6 mg/dL OR  Ionized Calcium LESS THAN 1.1 mmol/L  o Recent or planned invasive dental procedure (within 4 weeks)    Lab Results   Component Value Date    CALCIUM 8.7 02/08/2024      Lab Results   Component Value Date    CAION 1.13 04/02/2024       Labs reviewed and patient meets treatment conditions? Yes    DRUG SPECIFIC QUESTIONS:  Is the patient taking calcium and vitamin D? Yes  (Recommended)    Pt Instructed on following risks: (1) hypocalcemia, (2) osteonecrosis of the jaw, (3) atypical femoral fractures, (4) serious infections, and (5) dermatologic reactions?  Yes      REMINDER:  PREGNANCY CATEGORY X DRUG. OBTAIN NEGTATIVE PREGNANCY TEST PRIOR TO FIRST INFUSION FOR WOMEN OF CHILDBEARING ABILITY   REMS DRUG    Recommended Vitals/Observation:  Vitals: Obtain vitals prior to injection.  Observation: Patient may leave immediately following injection.        Weight Based Drug Calculations:    WEIGHT BASED DRUGS: NOT APPLICABLE / FLAT DOSE          Initiated By: Soo Brunson RN   Time: 10:37 AM     We administered denosumab.Patient visit conducted today by Soo Brunson RN for administration of Prolia Subcutaneous injection administered in left arm(s) and well-tolerated.

## 2024-04-05 NOTE — PATIENT INSTRUCTIONS
Today :We administered denosumab.     For:   1. Age related osteoporosis, unspecified pathological fracture presence         Your next appointment is due in:  10/8/24        Please read the  Medication Guide that was given to you and reviewed during todays visit.     (Tell all doctors including dentists that you are taking this medication)     Go to the emergency room or call 911 if:  -You have signs of allergic reaction:   -Rash, hives, itching.   -Swollen, blistered, peeling skin.   -Swelling of face, lips, mouth, tongue or throat.   -Tightness of chest, trouble breathing, swallowing or talking     Call your doctor:  - If IV / injection site gets red, warm, swollen, itchy or leaks fluid or pus.     (Leave dressing on your IV site for at least 2 hours and keep area clean and dry  - If you get sick or have symptoms of infection or are not feeling well for any reason.    (Wash your hands often, stay away from people who are sick)  - If you have side effects from your medication that do not go away or are bothersome.     (Refer to the teaching your nurse gave you for side effects to call your doctor about)    - Common side effects may include:  stuffy nose, headache, feeling tired, muscle aches, upset stomach  - Before receiving any vaccines     - Call the [facility] at   If:  - You get sick, are on antibiotics, have had a recent vaccine, have surgery or dental work and your doctor wants your visit rescheduled.  - You need to cancel and reschedule your visit for any reason. Call at least 2 days before your visit if you need to cancel.   - Your insurance changes before your next visit.    (We will need to get approval from your new insurance. This can take up to two weeks.)     The [facility] is opened Monday thru Friday. We are closed on weekends and holidays.   Voice mail will take your call if the center is closed. If you leave a message please allow 24 hours for a call back during  weekdays. If you leave a message on a weekend/holiday, we will call you back the next business day.

## 2024-06-18 ENCOUNTER — HOSPITAL ENCOUNTER (OUTPATIENT)
Dept: RADIOLOGY | Facility: CLINIC | Age: 74
Discharge: HOME | End: 2024-06-18
Payer: MEDICARE

## 2024-06-18 ENCOUNTER — APPOINTMENT (OUTPATIENT)
Dept: PRIMARY CARE | Facility: CLINIC | Age: 74
End: 2024-06-18
Payer: MEDICARE

## 2024-06-18 VITALS
SYSTOLIC BLOOD PRESSURE: 127 MMHG | HEART RATE: 76 BPM | BODY MASS INDEX: 25.87 KG/M2 | OXYGEN SATURATION: 93 % | WEIGHT: 146 LBS | DIASTOLIC BLOOD PRESSURE: 77 MMHG | HEIGHT: 63 IN

## 2024-06-18 DIAGNOSIS — R53.83 FATIGUE, UNSPECIFIED TYPE: ICD-10-CM

## 2024-06-18 DIAGNOSIS — M25.552 LEFT HIP PAIN: ICD-10-CM

## 2024-06-18 DIAGNOSIS — R73.03 PRE-DIABETES: ICD-10-CM

## 2024-06-18 DIAGNOSIS — K21.9 GASTROESOPHAGEAL REFLUX DISEASE, UNSPECIFIED WHETHER ESOPHAGITIS PRESENT: ICD-10-CM

## 2024-06-18 DIAGNOSIS — M25.552 LEFT HIP PAIN: Primary | ICD-10-CM

## 2024-06-18 DIAGNOSIS — R73.01 ELEVATED FASTING BLOOD SUGAR: ICD-10-CM

## 2024-06-18 DIAGNOSIS — R35.0 FREQUENT URINATION: ICD-10-CM

## 2024-06-18 LAB
POC APPEARANCE, URINE: CLEAR
POC BILIRUBIN, URINE: NEGATIVE
POC BLOOD, URINE: NEGATIVE
POC COLOR, URINE: YELLOW
POC GLUCOSE, URINE: NEGATIVE MG/DL
POC HEMOGLOBIN A1C: 5.7 % (ref 4.2–6.5)
POC KETONES, URINE: NEGATIVE MG/DL
POC LEUKOCYTES, URINE: NEGATIVE
POC NITRITE,URINE: NEGATIVE
POC PH, URINE: 5.5 PH
POC PROTEIN, URINE: NEGATIVE MG/DL
POC SPECIFIC GRAVITY, URINE: 1.02
POC UROBILINOGEN, URINE: 0.2 EU/DL

## 2024-06-18 PROCEDURE — 83036 HEMOGLOBIN GLYCOSYLATED A1C: CPT

## 2024-06-18 PROCEDURE — 1036F TOBACCO NON-USER: CPT

## 2024-06-18 PROCEDURE — 81003 URINALYSIS AUTO W/O SCOPE: CPT

## 2024-06-18 PROCEDURE — 73502 X-RAY EXAM HIP UNI 2-3 VIEWS: CPT | Mod: LEFT SIDE | Performed by: RADIOLOGY

## 2024-06-18 PROCEDURE — 73502 X-RAY EXAM HIP UNI 2-3 VIEWS: CPT | Mod: LT

## 2024-06-18 PROCEDURE — 1159F MED LIST DOCD IN RCRD: CPT

## 2024-06-18 PROCEDURE — 99214 OFFICE O/P EST MOD 30 MIN: CPT

## 2024-06-18 RX ORDER — FAMOTIDINE 20 MG/1
TABLET, FILM COATED ORAL
Qty: 90 TABLET | Refills: 1 | Status: SHIPPED | OUTPATIENT
Start: 2024-06-18

## 2024-06-18 ASSESSMENT — ENCOUNTER SYMPTOMS
NAUSEA: 0
FLANK PAIN: 0
EARLY SATIETY: 0
WATER BRASH: 0
VOMITING: 0
MUSCLE WEAKNESS: 0
STRIDOR: 0
NUMBNESS: 1
ABDOMINAL PAIN: 1
SWEATS: 0
HIP PAIN: 1
FREQUENCY: 1
TINGLING: 1
SORE THROAT: 0
WEIGHT LOSS: 0
INABILITY TO BEAR WEIGHT: 1
FATIGUE: 1

## 2024-06-18 NOTE — PROGRESS NOTES
Subjective   Patient ID: Elma Celestin is a 74 y.o. 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 Possible referral to ortho . ).    UTI   This is a new problem. The current episode started in the past 7 days. The problem occurs intermittently. The problem has been unchanged. There has been no fever. She is Not sexually active. There is No history of pyelonephritis. Associated symptoms include frequency. Pertinent negatives include no discharge, flank pain, nausea, possible pregnancy, sweats, urgency or vomiting. She has tried nothing for the symptoms.   Hip Pain   There was no injury mechanism. The pain is present in the left hip. The quality of the pain is described as shooting. The pain is severe. Associated symptoms include an inability to bear weight, numbness and tingling. Pertinent negatives include no muscle weakness. The symptoms are aggravated by weight bearing. Treatments tried: using cane she got after her hip replacement. The treatment provided no relief.   GERD  She complains of abdominal pain and dysphagia. She reports no chest pain, no early satiety, no nausea, no sore throat, no stridor or no water brash. This is a chronic problem. The current episode started more than 1 year ago. The problem occurs frequently. The problem has been unchanged. Associated symptoms include fatigue and muscle weakness. Pertinent negatives include no weight loss. She has tried a PPI for the symptoms. The treatment provided no relief. Past procedures do not include an abdominal ultrasound, an EGD, H. pylori antibody titer or a UGI.        Review of Systems   Constitutional:  Positive for fatigue. Negative for weight loss.   HENT:  Negative for sore throat.    Cardiovascular:  Negative for chest pain.  "  Gastrointestinal:  Positive for abdominal pain and dysphagia. Negative for nausea and vomiting.   Genitourinary:  Positive for frequency. Negative for flank pain and urgency.   Musculoskeletal:  Positive for muscle weakness.   Neurological:  Positive for tingling and numbness.       Objective   /77   Pulse 76   Ht 1.6 m (5' 3\")   Wt 66.2 kg (146 lb)   SpO2 93%   BMI 25.86 kg/m²     Physical Exam  Vitals reviewed.   Constitutional:       General: She is not in acute distress.     Appearance: Normal appearance. She is normal weight. She is not ill-appearing.   Cardiovascular:      Heart sounds: Normal heart sounds.   Pulmonary:      Breath sounds: Normal breath sounds.   Musculoskeletal:         General: Tenderness present.   Neurological:      Mental Status: She is alert.         Assessment/Plan   Elma was seen today for uti and hip pain.  Diagnoses and all orders for this visit:  Left hip pain  -     XR hip left with pelvis when performed 2 or 3 views; Future  Gastroesophageal reflux disease, unspecified whether esophagitis present  -     famotidine (Pepcid) 20 mg tablet; TAKE 1 TABLET BY MOUTH AS NEEDED FOR HEARTBURN.  Frequent urination  Comments:  UA clean  Orders:  -     POCT glycosylated hemoglobin (Hb A1C) manually resulted  -     POCT UA Automated manually resulted  Fatigue, unspecified type  -     POCT glycosylated hemoglobin (Hb A1C) manually resulted  Elevated fasting blood sugar  -     POCT glycosylated hemoglobin (Hb A1C) manually resulted  Pre-diabetes  Comments:  Educated on lifestyle modification, will monitor a1c with annual labs           "

## 2024-06-18 NOTE — PATIENT INSTRUCTIONS
"Type 2 diabetes    What is type 2 diabetes? -- Type 2 diabetes is a disorder that disrupts the way the body uses sugar. It is sometimes called type 2 diabetes mellitus.  All of the cells in the body need sugar to work normally. Sugar gets into the cells with the help of a hormone called insulin. Insulin is made by the pancreas, an organ in the belly. If there is not enough insulin, or if the body stops responding to insulin, sugar builds up in the blood. That is what happens to people with diabetes.  There are 2 different types of diabetes:  ?Type 1 diabetes - In type 1 diabetes, the pancreas does not make insulin or makes very little insulin.  ?Type 2 diabetes - In most people with type 2 diabetes, the body stops responding to insulin normally. Then, over time, the pancreas stops making enough insulin.  Having excess body weight or obesity increases a person's risk of developing type 2 diabetes. But people without excess body weight can get diabetes, too.  What are the symptoms of type 2 diabetes? -- Type 2 diabetes usually causes no symptoms. When symptoms do happen, they include:  ?Needing to urinate often  ?Intense thirst  ?Blurry vision  Can diabetes lead to other health problems? -- Yes. Type 2 diabetes might not make you feel sick. But if it is not managed, it can lead to serious problems over time, such as:  ?Heart attacks  ?Strokes  ?Kidney disease  ?Vision problems (or even blindness)  ?Pain or loss of feeling in the hands and feet  ?Needing to have fingers, toes, or other body parts removed (amputated)  How do I know if I have type 2 diabetes? -- To find out if you have type 2 diabetes, your doctor or nurse can do a blood test. There are 2 tests that can be used for this. Both involve measuring the amount of sugar in your blood, called your \"blood sugar\" or \"blood glucose\":  ?One of the tests measures your blood sugar at the time the blood sample is taken. This test is done in the morning. You can't eat " "or drink anything except water for at least 8 hours before the test.  ?The other test shows what your average blood sugar has been for the past 2 to 3 months. This blood test is called \"hemoglobin A1C\" or just \"A1C.\" It can be checked at any time of the day, even if you have recently eaten.  How is type 2 diabetes treated? -- The goals of treatment are to manage your blood sugar and lower the risk of future problems that can happen in people with diabetes.  Treatment might include:  ?Lifestyle changes - This is an important part of managing diabetes. It includes eating healthy foods and getting plenty of physical activity.  ?Eat healthy foods  ?Lose weight if you have excess body weight  ?Get plenty of physical activity  ?Avoid smoking  Making these lifestyle changes is as important as taking your medicines. They will also help improve your overall health.    ?Medicines - There are a few medicines that help lower blood sugar. Some people need to take pills that help the body make more insulin or that help insulin do its job. Others need insulin shots.  Depending on what medicines you take, you might need to check your blood sugar regularly at home. But not everyone with type 2 diabetes needs to do this. Your doctor or nurse will tell you if you should be checking your blood sugar, and when and how to do this.  Sometimes, people with type 2 diabetes also need medicines to help prevent problems caused by the disease. For instance, medicines used to lower blood pressure can reduce the chances of a heart attack or stroke.  ?General medical care - It's also important to take care of other areas of your health. This includes watching your blood pressure and cholesterol levels.   Can type 2 diabetes be prevented? -- Yes. To lower your chances of getting type 2 diabetes, the most important thing you can do is eat a healthy diet and get plenty of physical activity. This can help you lose weight if you are overweight. But " eating well and being active are also good for your overall health. Even gentle activity, like walking, has benefits.  If you smoke, quitting can also lower your risk of type 2 diabetes. Quitting smoking can be difficult, but your doctor or nurse can help.

## 2024-07-16 ENCOUNTER — APPOINTMENT (OUTPATIENT)
Dept: GASTROENTEROLOGY | Facility: CLINIC | Age: 74
End: 2024-07-16
Payer: MEDICARE

## 2024-07-16 ENCOUNTER — OFFICE VISIT (OUTPATIENT)
Dept: PRIMARY CARE | Facility: CLINIC | Age: 74
End: 2024-07-16
Payer: MEDICARE

## 2024-07-16 ENCOUNTER — HOSPITAL ENCOUNTER (OUTPATIENT)
Dept: RADIOLOGY | Facility: CLINIC | Age: 74
Discharge: HOME | End: 2024-07-16
Payer: MEDICARE

## 2024-07-16 VITALS
BODY MASS INDEX: 25.52 KG/M2 | SYSTOLIC BLOOD PRESSURE: 122 MMHG | HEART RATE: 77 BPM | WEIGHT: 144 LBS | HEIGHT: 63 IN | DIASTOLIC BLOOD PRESSURE: 80 MMHG | OXYGEN SATURATION: 98 %

## 2024-07-16 VITALS — HEART RATE: 81 BPM | HEIGHT: 62 IN | BODY MASS INDEX: 26.5 KG/M2 | WEIGHT: 144 LBS

## 2024-07-16 DIAGNOSIS — M54.50 LUMBAR PAIN: Primary | ICD-10-CM

## 2024-07-16 DIAGNOSIS — K21.9 GASTROESOPHAGEAL REFLUX DISEASE, UNSPECIFIED WHETHER ESOPHAGITIS PRESENT: ICD-10-CM

## 2024-07-16 DIAGNOSIS — D49.0 IPMN (INTRADUCTAL PAPILLARY MUCINOUS NEOPLASM): ICD-10-CM

## 2024-07-16 DIAGNOSIS — M54.50 LUMBAR PAIN: ICD-10-CM

## 2024-07-16 DIAGNOSIS — K63.5 POLYP OF COLON, UNSPECIFIED PART OF COLON, UNSPECIFIED TYPE: ICD-10-CM

## 2024-07-16 DIAGNOSIS — K58.0 IRRITABLE BOWEL SYNDROME WITH DIARRHEA: Primary | ICD-10-CM

## 2024-07-16 PROCEDURE — 1160F RVW MEDS BY RX/DR IN RCRD: CPT | Performed by: INTERNAL MEDICINE

## 2024-07-16 PROCEDURE — 1159F MED LIST DOCD IN RCRD: CPT | Performed by: NURSE PRACTITIONER

## 2024-07-16 PROCEDURE — 72110 X-RAY EXAM L-2 SPINE 4/>VWS: CPT

## 2024-07-16 PROCEDURE — 99204 OFFICE O/P NEW MOD 45 MIN: CPT | Performed by: INTERNAL MEDICINE

## 2024-07-16 PROCEDURE — 72110 X-RAY EXAM L-2 SPINE 4/>VWS: CPT | Performed by: RADIOLOGY

## 2024-07-16 PROCEDURE — 1159F MED LIST DOCD IN RCRD: CPT | Performed by: INTERNAL MEDICINE

## 2024-07-16 PROCEDURE — 99213 OFFICE O/P EST LOW 20 MIN: CPT | Performed by: NURSE PRACTITIONER

## 2024-07-16 PROCEDURE — 1160F RVW MEDS BY RX/DR IN RCRD: CPT | Performed by: NURSE PRACTITIONER

## 2024-07-16 PROCEDURE — 1036F TOBACCO NON-USER: CPT | Performed by: INTERNAL MEDICINE

## 2024-07-16 PROCEDURE — 1036F TOBACCO NON-USER: CPT | Performed by: NURSE PRACTITIONER

## 2024-07-16 RX ORDER — CHOLECALCIFEROL (VITAMIN D3) 50 MCG
50 TABLET ORAL DAILY
COMMUNITY

## 2024-07-16 RX ORDER — ASCORBIC ACID 500 MG
500 TABLET ORAL DAILY
COMMUNITY

## 2024-07-16 RX ORDER — SOD SULF/POT CHLORIDE/MAG SULF 1.479 G
TABLET ORAL
Qty: 24 TABLET | Refills: 0 | Status: SHIPPED | OUTPATIENT
Start: 2024-07-16

## 2024-07-16 ASSESSMENT — PATIENT HEALTH QUESTIONNAIRE - PHQ9
6. FEELING BAD ABOUT YOURSELF - OR THAT YOU ARE A FAILURE OR HAVE LET YOURSELF OR YOUR FAMILY DOWN: SEVERAL DAYS
5. POOR APPETITE OR OVEREATING: SEVERAL DAYS
8. MOVING OR SPEAKING SO SLOWLY THAT OTHER PEOPLE COULD HAVE NOTICED. OR THE OPPOSITE, BEING SO FIGETY OR RESTLESS THAT YOU HAVE BEEN MOVING AROUND A LOT MORE THAN USUAL: NOT AT ALL
4. FEELING TIRED OR HAVING LITTLE ENERGY: NEARLY EVERY DAY
SUM OF ALL RESPONSES TO PHQ QUESTIONS 1-9: 16
7. TROUBLE CONCENTRATING ON THINGS, SUCH AS READING THE NEWSPAPER OR WATCHING TELEVISION: MORE THAN HALF THE DAYS
2. FEELING DOWN, DEPRESSED OR HOPELESS: NEARLY EVERY DAY
1. LITTLE INTEREST OR PLEASURE IN DOING THINGS: NEARLY EVERY DAY
SUM OF ALL RESPONSES TO PHQ9 QUESTIONS 1 AND 2: 6
3. TROUBLE FALLING OR STAYING ASLEEP OR SLEEPING TOO MUCH: NEARLY EVERY DAY
9. THOUGHTS THAT YOU WOULD BE BETTER OFF DEAD, OR OF HURTING YOURSELF: NOT AT ALL
10. IF YOU CHECKED OFF ANY PROBLEMS, HOW DIFFICULT HAVE THESE PROBLEMS MADE IT FOR YOU TO DO YOUR WORK, TAKE CARE OF THINGS AT HOME, OR GET ALONG WITH OTHER PEOPLE: NOT DIFFICULT AT ALL

## 2024-07-16 ASSESSMENT — ENCOUNTER SYMPTOMS
OCCASIONAL FEELINGS OF UNSTEADINESS: 1
LOSS OF SENSATION IN FEET: 1

## 2024-07-16 NOTE — PROGRESS NOTES
REASON FOR VISIT: Discuss reflux and diarrhea    HPI:  Elma Celestin is a 74 y.o. 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  Not seen since.  Complaining of recent heartburn symptoms occurring frequently.  Taking famotidine off-and-on over the past year.  No dysphagia.  Seen by ENT and told that she had evidence of LPR.  Has had symptoms of throat clearing as well as nonproductive cough in the past.  No prior upper endoscopy.  Also reports over the past few months intermittent diarrhea and bloating symptoms.  She had had COVID at some point prior to that.  No rectal bleeding.  Last colonoscopy about 5 years ago and has history of colon polyps.  Recent MRCP done within the past 10 months showed stable pancreatic changes consistent with sidebranch PMN as well as stable hemangioma in the liver.          PRIOR ENDOSCOPY    PAST MEDICAL HISTORY  Past Medical History:   Diagnosis Date    Abdominal bloating 02/29/2024    Abrasion of left knee 02/29/2024    Acute bronchitis 02/29/2024    Acute upper respiratory infection 02/29/2024    Aftercare following joint replacement surgery 09/13/2019    Orthopedic aftercare for joint replacement    Anemia 02/29/2024    Anesthesia of skin 06/02/2022    Numbness of extremity    Anxiety 06/26/2023    Arthritis     Bursitis of hip 02/29/2024    Cataract     Cough 02/29/2024    Cyst of pancreas ([facility]) 10/24/2018    Pancreatic cyst    Depression     Dizziness 09/20/2023    Discussed changing positions slowly when laying down  Red flags discussed  Happens only when going from laying to sitting    Eczema of both external ears 06/26/2023    Elevated BP without diagnosis of hypertension 06/26/2023    Encounter for screening for malignant neoplasm of respiratory organs 09/25/2019    Encounter for screening for lung cancer    Epigastric pain 09/24/2018    Dyspepsia    Epistaxis 02/29/2024     Excessive thirst 02/29/2024    Fall on same level from stumbling 02/29/2024    Fatigue 02/29/2024    GERD (gastroesophageal reflux disease)     Herpesviral vesicular dermatitis 04/15/2016    Recurrent cold sores    History of thyroid cancer     Hyperglycemia, unspecified 11/28/2022    Stress hyperglycemia    Left shoulder pain 06/26/2023    Local infection of the skin and subcutaneous tissue, unspecified 11/06/2017    Skin infection    Lumbar radiculitis 06/26/2023    Osteoarthritis of hip 06/26/2023    Other bursitis of hip, right hip 07/13/2020    Bursitis of right hip    Other conditions influencing health status 04/22/2019    Antibiotic prophylaxis for dental procedure indicated due to prior joint replacement    Other intervertebral disc displacement, lumbosacral region 12/29/2021    Displacement of lumbosacral intervertebral disc    Overweight 12/29/2021    Overweight with body mass index (BMI) of 27 to 27.9 in adult    Personal history of other benign neoplasm 10/24/2018    History of other benign neoplasm    Personal history of other diseases of the musculoskeletal system and connective tissue 11/24/2020    History of osteoporosis    Personal history of other mental and behavioral disorders 07/26/2018    History of depression    Personal history of other specified conditions 09/24/2018    History of urinary frequency    Personal history of other specified conditions 10/07/2020    History of multiple pulmonary nodules    Personal history of urinary (tract) infections     History of urinary tract infection    Presence of right artificial hip joint 03/18/2016    Status post right hip replacement    Primary osteoarthritis of right hip 06/26/2023    Pseudophakia 06/26/2023    Rash and other nonspecific skin eruption 09/25/2019    Skin rash    Rhinitis 06/26/2023    Right leg numbness 06/26/2023    Spondylosis without myelopathy or radiculopathy, sacral and sacrococcygeal region 05/04/2021    Arthropathy of  right sacroiliac joint    Stress hyperglycemia 06/26/2023    Thyroid cancer (Multi) 06/26/2023    Thyroid nodule 06/26/2023    Toxic effect of venom of bees, accidental (unintentional), initial encounter 07/25/2016    Bee sting reaction, accidental or unintentional, initial encounter    Venous thrombosis 09/30/2023       PAST SURGICAL HISTORY  Past Surgical History:   Procedure Laterality Date    CATARACT EXTRACTION Right 02/28/2023    CATARACT EXTRACTION  04/11/2023    COLONOSCOPY  05/27/2014    Complete Colonoscopy    EYE SURGERY  2023    HYSTERECTOMY  07/12/2013    Hysterectomy    JOINT REPLACEMENT  2014    TOTAL HIP ARTHROPLASTY  05/27/2014    Total Hip Replacement       FAMILY HISTORY  Family History   Problem Relation Name Age of Onset    Anesthesia related problems Mother Shelley     Heart disease Mother Shelley     Leukemia Mother Shelley     Uterine cancer Mother Shelley     Diabetes Father      Cancer Mother's Brother Masayuki     Heart disease Mother's Brother Katsumi     Arthritis Maternal Grandmother Fusako     Stomach cancer Other Uncle        SOCIAL HISTORY  Social History     Tobacco Use    Smoking status: Former     Current packs/day: 0.00     Types: Cigarettes     Quit date: 5/30/2009     Years since quitting: 15.1    Smokeless tobacco: Never   Substance Use Topics    Alcohol use: Yes     Comment: social       REVIEW OF SYSTEMS  CONSTITUTIONAL: negative for fever, chills, fatigue, or unintentional weight loss,   HEENT: negative for icteric sclera, eye pain/redness, or changes in vision/hearing  RESPIRATORY: negative for cough, hemoptysis, wheezing, orthopnea, or dyspnea on exertion  CARDIOVASCULAR: negative for chest pain, palpitations, or syncope   GASTROINTESTINAL: as noted per HPI  GENITOURINARY: negative for dysuria, polyuria, incontinence, or hematuria  MUSCULOSKELETAL: negative for arthralgia, myalgia, or joint swelling/stiffness   INTEGUMENTARY/SKIN: negative jaundice, rash, or skin  lesion  HEMATOLOGIC/LYMPHATIC: negative for prolonged bleeding, easy bruising, or swollen lymph nodes  ENDOCRINE: negative for cold/heat intolerance, polydipsia, polyuria, or goiter  NEUROLOGIC: negative for headaches, dizziness, tremor, or gait abnormality  PSYCHIATRIC: negative for anxiety, depression, personality changes, or sleep disturbances      A 10 point review of systems was completed and was otherwise negative.    ALLERGIES  Allergies   Allergen Reactions    Latex, Natural Rubber Other    Meloxicam Other     nose bleeds    Adhesive Rash       MEDICATIONS  Current Outpatient Medications   Medication Sig Dispense Refill    ascorbic acid (Vitamin C) 500 mg tablet Take 1 tablet (500 mg) by mouth once daily.      calcium carbonate (CALCIUM 500 ORAL) Take 1 each by mouth once daily.      cholecalciferol (Vitamin D3) 50 MCG (2000 UT) tablet Take 1 tablet (50 mcg) by mouth once daily.      cyanocobalamin (Vitamin B-12) 50 mcg tablet Take 1 tablet (50 mcg) by mouth once daily.      denosumab (Prolia) 60 mg/mL syringe Inject 1 mL (60 mg) under the skin every 6 (six) months.      famotidine (Pepcid) 20 mg tablet TAKE 1 TABLET BY MOUTH AS NEEDED FOR HEARTBURN. 90 tablet 1    ibuprofen 800 mg tablet Take 1 tablet (800 mg) by mouth 3 times a day. 270 tablet 0    buPROPion SR (Wellbutrin SR) 100 mg 12 hr tablet Take 1 tablet (100 mg) by mouth once daily. Do not crush, chew, or split. (Patient not taking: Reported on 2/29/2024) 30 tablet 2    hydrOXYzine HCL (Atarax) 10 mg tablet Take 1 tablet (10 mg) by mouth 2 times a day as needed for anxiety. 60 tablet 2    sod sulf-pot chloride-mag sulf (Sutab) 1.479-0.188- 0.225 gram tablet Starting at 6pm open one bottle of pills and fill glass provided with water and drink according to prep sheet. Start the 2nd bottle with directions on prep sheet 5 hours before procedure time 24 tablet 0     No current facility-administered medications for this visit.       VITALS  Pulse 81   Ht  "1.562 m (5' 1.5\")   Wt 65.3 kg (144 lb)   BMI 26.77 kg/m²      PHYSICAL EXAM  Alert oriented in no acute distress    ASSESSMENT/ PLAN  Patient with history of sidebranch IPMN and hepatic and angioma that are stable on imaging with intermittent diarrhea and bloating symptoms.  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  Has had history of colon polyps and due for surveillance colonoscopy which we will pursue.  Has GERD and LPR symptoms.  Recommended upper endoscopy at the same time to rule out underlying esophagitis or Smith's esophagus and assess for hiatal hernia.  She is in agreement with the plan and will follow-up with me at endoscopy.        Signature: Cheyenne Miguel MD    Date: 7/16/2024  Time: 2:02 PM    "

## 2024-07-16 NOTE — PROGRESS NOTES
"Subjective   Patient ID: Elma Celestin is a 74 y.o. female who presents for Back Pain (Elma is here today for lower back pain x 1 week she would like a referral, states she has tingling in both feet. Muscus x 5 days).    Presents for complaints of lumbar back pain x 1 week and she not sure if she needs a referral to a spine specialist or the next step.   she does have some tingling in bilateral feet.  She denies numbness.  She denies change in bowel or bladder habits or fall or injury.  She describes the pain as a constant ache.         Review of Systems   All other systems reviewed and are negative.      Objective   /80   Pulse 77   Ht 1.6 m (5' 3\")   Wt 65.3 kg (144 lb)   SpO2 98%   BMI 25.51 kg/m²     Physical Exam  Vitals and nursing note reviewed.   Constitutional:       General: She is not in acute distress.     Appearance: Normal appearance.   HENT:      Head: Normocephalic and atraumatic.      Right Ear: External ear normal.      Left Ear: External ear normal.      Nose: Nose normal.      Mouth/Throat:      Mouth: Mucous membranes are moist.      Pharynx: Oropharynx is clear.   Eyes:      Extraocular Movements: Extraocular movements intact.      Conjunctiva/sclera: Conjunctivae normal.      Pupils: Pupils are equal, round, and reactive to light.   Neck:      Vascular: No carotid bruit.   Cardiovascular:      Rate and Rhythm: Normal rate and regular rhythm.      Pulses: Normal pulses.      Heart sounds: Normal heart sounds.   Pulmonary:      Effort: Pulmonary effort is normal.      Breath sounds: Normal breath sounds.   Musculoskeletal:         General: No swelling. Normal range of motion.      Cervical back: Normal range of motion and neck supple.      Comments: Slight tenderness with moderate palpation to lower lumbar region midline   Lymphadenopathy:      Cervical: No cervical adenopathy.   Skin:     General: Skin is warm and dry.      Capillary Refill: Capillary refill takes less than 2 " seconds.   Neurological:      Mental Status: She is alert and oriented to person, place, and time.   Psychiatric:         Mood and Affect: Mood normal.         Behavior: Behavior normal.         Thought Content: Thought content normal.         Judgment: Judgment normal.         Assessment/Plan

## 2024-07-16 NOTE — PATIENT INSTRUCTIONS
Have the xray done - then I will know if you will need to go to PT or a specialist   Take the ibuprofen with food 3 times a day as needed

## 2024-07-31 PROBLEM — B37.9 YEAST INFECTION: Status: RESOLVED | Noted: 2024-02-29 | Resolved: 2024-07-31

## 2024-07-31 PROBLEM — C73 PAPILLARY THYROID CARCINOMA (MULTI): Status: RESOLVED | Noted: 2023-06-26 | Resolved: 2024-07-31

## 2024-07-31 PROBLEM — M75.102 TEAR OF LEFT ROTATOR CUFF: Status: RESOLVED | Noted: 2023-09-30 | Resolved: 2024-07-31

## 2024-07-31 PROBLEM — M54.50 LUMBAR PAIN: Status: ACTIVE | Noted: 2024-07-31

## 2024-07-31 NOTE — ASSESSMENT & PLAN NOTE
We will do x-ray of lumbar area.  After we perform x-ray then we will be able to know if we need to send for physical therapy versus medical spine and she is agreeable to this

## 2024-08-08 ENCOUNTER — APPOINTMENT (OUTPATIENT)
Dept: GASTROENTEROLOGY | Facility: EXTERNAL LOCATION | Age: 74
End: 2024-08-08
Payer: MEDICARE

## 2024-08-08 DIAGNOSIS — K21.9 GASTROESOPHAGEAL REFLUX DISEASE, UNSPECIFIED WHETHER ESOPHAGITIS PRESENT: Primary | ICD-10-CM

## 2024-08-08 DIAGNOSIS — K21.9 GASTROESOPHAGEAL REFLUX DISEASE, UNSPECIFIED WHETHER ESOPHAGITIS PRESENT: ICD-10-CM

## 2024-08-08 DIAGNOSIS — Z86.010 HISTORY OF COLON POLYPS: ICD-10-CM

## 2024-08-08 DIAGNOSIS — K31.89 OTHER DISEASES OF STOMACH AND DUODENUM: ICD-10-CM

## 2024-08-08 DIAGNOSIS — K21.00 GASTROESOPHAGEAL REFLUX DISEASE WITH ESOPHAGITIS WITHOUT HEMORRHAGE: Primary | ICD-10-CM

## 2024-08-08 DIAGNOSIS — Z12.11 COLON CANCER SCREENING: ICD-10-CM

## 2024-08-08 PROCEDURE — 0753T DGTZ GLS MCRSCP SLD LEVEL IV: CPT

## 2024-08-08 PROCEDURE — G0105 COLORECTAL SCRN; HI RISK IND: HCPCS | Performed by: INTERNAL MEDICINE

## 2024-08-08 PROCEDURE — 88305 TISSUE EXAM BY PATHOLOGIST: CPT

## 2024-08-08 PROCEDURE — 43239 EGD BIOPSY SINGLE/MULTIPLE: CPT | Performed by: INTERNAL MEDICINE

## 2024-08-08 RX ORDER — OMEPRAZOLE 40 MG/1
40 CAPSULE, DELAYED RELEASE ORAL
Qty: 30 CAPSULE | Refills: 0 | Status: SHIPPED | OUTPATIENT
Start: 2024-08-08 | End: 2024-09-07

## 2024-08-09 ENCOUNTER — LAB REQUISITION (OUTPATIENT)
Dept: LAB | Facility: HOSPITAL | Age: 74
End: 2024-08-09
Payer: MEDICARE

## 2024-08-16 LAB
LABORATORY COMMENT REPORT: NORMAL
PATH REPORT.FINAL DX SPEC: NORMAL
PATH REPORT.GROSS SPEC: NORMAL
PATH REPORT.RELEVANT HX SPEC: NORMAL
PATH REPORT.TOTAL CANCER: NORMAL

## 2024-08-16 NOTE — RESULT ENCOUNTER NOTE
The biopsies that were taken from your stomach did not show any evidence of H. pylori bacterial infection.  The recommendation is to follow-up in the office as needed.        Cheyenne Miguel MD

## 2024-09-03 ENCOUNTER — APPOINTMENT (OUTPATIENT)
Dept: RADIOLOGY | Facility: HOSPITAL | Age: 74
End: 2024-09-03
Payer: MEDICARE

## 2024-09-05 ENCOUNTER — HOSPITAL ENCOUNTER (OUTPATIENT)
Dept: RADIOLOGY | Facility: HOSPITAL | Age: 74
Discharge: HOME | End: 2024-09-05
Payer: MEDICARE

## 2024-09-05 DIAGNOSIS — C73 PAPILLARY THYROID CARCINOMA (MULTI): ICD-10-CM

## 2024-09-05 PROCEDURE — 76536 US EXAM OF HEAD AND NECK: CPT

## 2024-09-05 PROCEDURE — 76536 US EXAM OF HEAD AND NECK: CPT | Performed by: RADIOLOGY

## 2024-09-06 ENCOUNTER — APPOINTMENT (OUTPATIENT)
Dept: DERMATOLOGY | Facility: CLINIC | Age: 74
End: 2024-09-06
Payer: MEDICARE

## 2024-09-06 DIAGNOSIS — L81.0 POST-INFLAMMATORY HYPERPIGMENTATION: ICD-10-CM

## 2024-09-06 DIAGNOSIS — T63.483A TOXIC EFFECT OF VENOM OF OTHER ARTHROPOD, ASSAULT, INITIAL ENCOUNTER: ICD-10-CM

## 2024-09-06 DIAGNOSIS — L81.1 MELASMA: ICD-10-CM

## 2024-09-06 DIAGNOSIS — L82.1 SEBORRHEIC KERATOSIS: ICD-10-CM

## 2024-09-06 DIAGNOSIS — D22.9 BENIGN NEVUS: Primary | ICD-10-CM

## 2024-09-06 DIAGNOSIS — L57.9 SKIN CHANGES DUE TO CHRONIC EXPOSURE TO NONIONIZING RADIATION: ICD-10-CM

## 2024-09-06 DIAGNOSIS — L30.9 DERMATITIS: ICD-10-CM

## 2024-09-06 DIAGNOSIS — L81.4 LENTIGO: ICD-10-CM

## 2024-09-06 PROCEDURE — 1036F TOBACCO NON-USER: CPT | Performed by: NURSE PRACTITIONER

## 2024-09-06 PROCEDURE — 1160F RVW MEDS BY RX/DR IN RCRD: CPT | Performed by: NURSE PRACTITIONER

## 2024-09-06 PROCEDURE — 99214 OFFICE O/P EST MOD 30 MIN: CPT | Performed by: NURSE PRACTITIONER

## 2024-09-06 PROCEDURE — 1159F MED LIST DOCD IN RCRD: CPT | Performed by: NURSE PRACTITIONER

## 2024-09-06 RX ORDER — TRIAMCINOLONE ACETONIDE 1 MG/G
CREAM TOPICAL
Qty: 80 G | Refills: 0 | Status: SHIPPED | OUTPATIENT
Start: 2024-09-06

## 2024-09-06 NOTE — PATIENT INSTRUCTIONS

## 2024-09-06 NOTE — PROGRESS NOTES
Subjective     Elma Celestin is a 74 y.o. female who presents for the following: Skin Check.     Review of Systems:  No other skin or systemic complaints other than what is documented elsewhere in the note.    The following portions of the chart were reviewed this encounter and updated as appropriate:   Tobacco  Allergies  Meds  Problems  Med Hx  Surg Hx         Skin Cancer History  No skin cancer on file.      Specialty Problems          Dermatology Problems    Chloasma    Hemangioma of skin and subcutaneous tissue    Hypertrophic lichen planus    Inflamed seborrheic keratosis    Melanocytic nevi of trunk    Melanocytic nevi of unspecified lower limb, including hip    Melanocytic nevi of unspecified upper limb, including shoulder    Other melanin hyperpigmentation    Rhytidosis facialis    Scar condition and fibrosis of skin    Spider veins        Objective   Well appearing patient in no apparent distress; mood and affect are within normal limits.    A full examination was performed including scalp, head, eyes, ears, nose, lips, neck, chest, axillae, abdomen, back, buttocks, bilateral upper extremities, bilateral lower extremities, hands, feet, fingers, toes, fingernails, and toenails. All findings within normal limits unless otherwise noted below.      Assessment/Plan   1. Benign nevus  Scattered, uniform and benign-appearing, regular brown melanocytic papules and macules.    1. Right medial posterior superior thigh has a 5.5 x 8 mm oval tan macule with some speckled dark brown dots semi symmetrically     The present appearance of the lesion is not worrisome but it should continue to be observed and testing/treatment may be warranted if change occurs.    Lesion on the right thigh lesion is stable. Will continue to monitor.     2. Melasma  Left Zygomatic Area  2.5 x 3 cm faint tan patch    Discoloration is blending in well without surrounding skin. Patient is  doing a great job with sun protection. Plan is  to restart tri ariel at bedtime daily for 8 weeks then break for 8 weeks in the winter time     Related Procedures  Follow Up In Dermatology - Established Patient    3. Post-inflammatory hyperpigmentation    Related Procedures  Follow Up In Dermatology - Established Patient    4. Seborrheic keratosis  Stuck on verrucous, tan-brown papules and plaques.      Seborrheic keratoses are common noncancerous (benign) growths of unknown cause seen in adults due to a thickening of an area of the top skin layer. Seborrheic keratoses may appear as if they are stuck on to the skin. They have distinct borders, and they may appear as papules (small, solid bumps) or plaques (solid, raised patches that are bigger than a thumbnail). They may be the same color as your skin, or they may be pink, light brown, darker brown, or very dark brown, or sometimes may appear black.    There is no way to prevent new seborrheic keratoses from forming. Seborrheic keratoses can be removed, but removal is considered a cosmetic issue and is usually not covered by insurance.    PLAN  No treatment is needed unless there is irritation from clothing, such as itching or bleeding.  2.   Some lotions containing alpha hydroxy acids, salicylic acid, or urea may make the areas feel smoother with regular use but will not eliminate them.    5. Lentigo  Scattered tan macules in sun-exposed areas.    A solar lentigo (plural, solar lentigines), also known as a sun-induced freckle or senile lentigo, is a dark (hyperpigmented) lesion caused by natural or artificial ultraviolet (UV) light. Solar lentigines may be single or multiple. This type of lentigo is different from a simple lentigo (lentigo simplex) because it is caused by exposure to UV light. Solar lentigines are benign, but they do indicate excessive sun exposure, a risk factor for the development of skin cancer.    To prevent solar lentigines, avoid exposure to sunlight in midday (10 AM to 3 PM), wear  sun-protective clothing (tightly woven clothes and hats), and apply sunscreen (SPF 30 UVA and UVB block).    The present appearance of the lesion is not worrisome but it should continue to be observed and testing/treatment may be warranted if change occurs.    6. Skin changes due to chronic exposure to nonionizing radiation  Actinic changes in the form of freckles, lentigines and hyper/hypopigmentation     ABCDEs of melanoma and atypical moles were discussed with the patient.    Patient was instructed to perform monthly self skin examination.  We recommended that the patient have regular full skin exams given an increased risk of subsequent skin cancers.    The patient was instructed to use sun protective behaviors including use of broad spectrum sunscreens and sun protective clothing to reduce risk of skin cancers.    Warning signs of non-melanoma skin cancer discussed.    7. Dermatitis  Right Anterior Lobule  Erythematous scaly macule    Developed after wearing ear rings. Advised likely metal contact allergy. She stopped wearing the ear rings about 4 weeks ago. Advised to start triamcinolone  twice daily to affected areas on the arms, body, and legs for 2 weeks then daily x1 week then every other day x1 week then as needed. When using as needed, use less than 14 days per month.  4.  Apply Hydrocortisone 2.5% cream twice daily to scalp, face, axilla, or groin for 2 weeks then daily x1 week then every other day x1 week then as needed. When using as needed, use less than 14 days per month.    8. Toxic effect of venom of other arthropod, assault, initial encounter  Arms, Chest, Legs  Scattered pink excoriated papules    Patient is cleaning out a house at this time and previous tennet had dogs and cats. Advised could be 2/2 to flea bites. May use TAC BID PRN.         Return to clinic in 1 year for skin check/follow up or sooner if needed

## 2024-09-27 ENCOUNTER — TELEPHONE (OUTPATIENT)
Dept: SURGICAL ONCOLOGY | Facility: CLINIC | Age: 74
End: 2024-09-27
Payer: MEDICARE

## 2024-09-27 DIAGNOSIS — K86.2 PANCREATIC CYST (HHS-HCC): Primary | ICD-10-CM

## 2024-09-27 NOTE — TELEPHONE ENCOUNTER
Called patient to schedule annual MRI for cyst surveillance. Patient was unable to reach her phone. Left a voicemail with my information.    Megan Smith MA

## 2024-10-02 ENCOUNTER — LAB (OUTPATIENT)
Dept: LAB | Facility: LAB | Age: 74
End: 2024-10-02
Payer: MEDICARE

## 2024-10-02 DIAGNOSIS — M81.0 AGE RELATED OSTEOPOROSIS, UNSPECIFIED PATHOLOGICAL FRACTURE PRESENCE: ICD-10-CM

## 2024-10-02 DIAGNOSIS — M81.0 AGE RELATED OSTEOPOROSIS, UNSPECIFIED PATHOLOGICAL FRACTURE PRESENCE: Primary | ICD-10-CM

## 2024-10-02 DIAGNOSIS — C73 PAPILLARY THYROID CARCINOMA (MULTI): ICD-10-CM

## 2024-10-02 LAB
ALBUMIN SERPL BCP-MCNC: 3.9 G/DL (ref 3.4–5)
ALP SERPL-CCNC: 49 U/L (ref 33–136)
ALT SERPL W P-5'-P-CCNC: 17 U/L (ref 7–45)
ANION GAP SERPL CALC-SCNC: 10 MMOL/L (ref 10–20)
AST SERPL W P-5'-P-CCNC: 20 U/L (ref 9–39)
BILIRUB SERPL-MCNC: 0.4 MG/DL (ref 0–1.2)
BUN SERPL-MCNC: 12 MG/DL (ref 6–23)
CALCIUM SERPL-MCNC: 8.3 MG/DL (ref 8.6–10.3)
CHLORIDE SERPL-SCNC: 105 MMOL/L (ref 98–107)
CO2 SERPL-SCNC: 27 MMOL/L (ref 21–32)
CREAT SERPL-MCNC: 0.74 MG/DL (ref 0.5–1.05)
EGFRCR SERPLBLD CKD-EPI 2021: 84 ML/MIN/1.73M*2
GLUCOSE SERPL-MCNC: 128 MG/DL (ref 74–99)
POTASSIUM SERPL-SCNC: 3.7 MMOL/L (ref 3.5–5.3)
PROT SERPL-MCNC: 6.6 G/DL (ref 6.4–8.2)
SODIUM SERPL-SCNC: 138 MMOL/L (ref 136–145)
TSH SERPL-ACNC: 1.01 MIU/L (ref 0.44–3.98)

## 2024-10-02 PROCEDURE — 80053 COMPREHEN METABOLIC PANEL: CPT

## 2024-10-02 PROCEDURE — 84443 ASSAY THYROID STIM HORMONE: CPT

## 2024-10-02 PROCEDURE — 36415 COLL VENOUS BLD VENIPUNCTURE: CPT

## 2024-10-03 ENCOUNTER — TELEPHONE (OUTPATIENT)
Dept: RHEUMATOLOGY | Facility: CLINIC | Age: 74
End: 2024-10-03
Payer: MEDICARE

## 2024-10-03 ENCOUNTER — DOCUMENTATION (OUTPATIENT)
Dept: INFUSION THERAPY | Facility: CLINIC | Age: 74
End: 2024-10-03
Payer: MEDICARE

## 2024-10-03 DIAGNOSIS — E83.51 HYPOCALCEMIA: Primary | ICD-10-CM

## 2024-10-03 NOTE — PROGRESS NOTES
CLINICAL CLEARANCE FOR OUTPATIENT INJECTION      Patient to be scheduled for Continuation of Prolia injections.    For Diagnosis: Osteoporosis    Labs.. Drawn on: 03/27/2025  Ca+ 9.1  CrCl: 43.68    Lab Results   Component Value Date    CALCIUM 8.3 (L) 10/02/2024      Lab Results   Component Value Date    CAION 1.13 04/02/2024      Lab Results   Component Value Date    GLUCOSE 128 (H) 10/02/2024    CALCIUM 8.3 (L) 10/02/2024     10/02/2024    K 3.7 10/02/2024    CO2 27 10/02/2024     10/02/2024    BUN 12 10/02/2024    CREATININE 0.74 10/02/2024        Chemistry    Lab Results   Component Value Date/Time     10/02/2024 1458    K 3.7 10/02/2024 1458     10/02/2024 1458    CO2 27 10/02/2024 1458    BUN 12 10/02/2024 1458    CREATININE 0.74 10/02/2024 1458    Lab Results   Component Value Date/Time    CALCIUM 8.3 (L) 10/02/2024 1458    ALKPHOS 49 10/02/2024 1458    AST 20 10/02/2024 1458    ALT 17 10/02/2024 1458    BILITOT 0.4 10/02/2024 1458             (CA must be >8.6mg/dl or ionized calcium WNL.  Hold / Contact provider if <8.6) (must be within 28 days of scheduled injection)    Lab Results   Component Value Date    CREATININE 0.74 10/02/2024          (Patients with a crcl <30 are at increased risk of hypocalcemia)      *Not a hard stop. If crcl < 30 pt to discuss supplementation with prescribing provider.    Calcium and Vitamin D supplement on medication list? Yes    Current Outpatient Medications:     ascorbic acid (Vitamin C) 500 mg tablet, Take 1 tablet (500 mg) by mouth once daily., Disp: , Rfl:     calcium carbonate (CALCIUM 500 ORAL), Take 1 each by mouth once daily., Disp: , Rfl:     cholecalciferol (Vitamin D3) 50 MCG (2000 UT) tablet, Take 1 tablet (50 mcg) by mouth once daily., Disp: , Rfl:     cyanocobalamin (Vitamin B-12) 50 mcg tablet, Take 1 tablet (50 mcg) by mouth once daily., Disp: , Rfl:     denosumab (Prolia) 60 mg/mL syringe, Inject 1 mL (60 mg) under the skin every 6  (six) months., Disp: , Rfl:     famotidine (Pepcid) 20 mg tablet, TAKE 1 TABLET BY MOUTH AS NEEDED FOR HEARTBURN., Disp: 90 tablet, Rfl: 1    hydrOXYzine HCL (Atarax) 10 mg tablet, Take 1 tablet (10 mg) by mouth 2 times a day as needed for anxiety., Disp: 60 tablet, Rfl: 2    ibuprofen 800 mg tablet, Take 1 tablet (800 mg) by mouth 3 times a day., Disp: 270 tablet, Rfl: 0    omeprazole (PriLOSEC) 40 mg DR capsule, Take 1 capsule (40 mg) by mouth once daily in the morning. Take before meals. Do not crush or chew., Disp: 30 capsule, Rfl: 0    sod sulf-pot chloride-mag sulf (Sutab) 1.479-0.188- 0.225 gram tablet, Starting at 6pm open one bottle of pills and fill glass provided with water and drink according to prep sheet. Start the 2nd bottle with directions on prep sheet 5 hours before procedure time, Disp: 24 tablet, Rfl: 0    triamcinolone (Kenalog) 0.1 % cream, Apply to affected areas twice daily for 2 weeks then daily for 1 week then every other day for 1 week then stop. Then may used as needed when active. When using for maintenance, use less than 14 days per month., Disp: 80 g, Rfl: 0   (if no nurse to encourage discussion of supplementation at visit)    No obvious recent dental work per chart review. Nurse to confirm no dental within past/next 4 weeks at encounter.    Urine Hcg test ordered prior to first injection?Not applicable (If female pt <60 years of age and with reproductive capability)    Last injection received: 10/16/2024 (if continuation)    Due: 4/24/2025     Ok to schedule for prolia within 28 days of the calcium lab draw date listed above. OR… Ok to schedule for prolia; please instruct pt to have labs drawn no more than 28 days prior to their scheduled appointment

## 2024-10-08 ENCOUNTER — APPOINTMENT (OUTPATIENT)
Dept: INFUSION THERAPY | Facility: CLINIC | Age: 74
End: 2024-10-08
Payer: MEDICARE

## 2024-10-10 ENCOUNTER — LAB (OUTPATIENT)
Dept: LAB | Facility: LAB | Age: 74
End: 2024-10-10
Payer: MEDICARE

## 2024-10-10 DIAGNOSIS — E83.51 HYPOCALCEMIA: ICD-10-CM

## 2024-10-10 LAB
ANION GAP SERPL CALC-SCNC: 11 MMOL/L (ref 10–20)
BUN SERPL-MCNC: 16 MG/DL (ref 6–23)
CALCIUM SERPL-MCNC: 9 MG/DL (ref 8.6–10.3)
CHLORIDE SERPL-SCNC: 103 MMOL/L (ref 98–107)
CO2 SERPL-SCNC: 30 MMOL/L (ref 21–32)
CREAT SERPL-MCNC: 0.78 MG/DL (ref 0.5–1.05)
EGFRCR SERPLBLD CKD-EPI 2021: 79 ML/MIN/1.73M*2
GLUCOSE SERPL-MCNC: 78 MG/DL (ref 74–99)
POTASSIUM SERPL-SCNC: 4 MMOL/L (ref 3.5–5.3)
SODIUM SERPL-SCNC: 140 MMOL/L (ref 136–145)

## 2024-10-10 PROCEDURE — 82306 VITAMIN D 25 HYDROXY: CPT

## 2024-10-10 PROCEDURE — 80048 BASIC METABOLIC PNL TOTAL CA: CPT

## 2024-10-10 PROCEDURE — 36415 COLL VENOUS BLD VENIPUNCTURE: CPT

## 2024-10-11 LAB — 25(OH)D3 SERPL-MCNC: 39 NG/ML (ref 30–100)

## 2024-10-11 NOTE — TELEPHONE ENCOUNTER
Patient confirmed I did speak with her earlier in the week.  
Pretty sure I spoke to this patient Thursday or Friday and neglected to document conversation. Called and LM on  to check if we spoke.   
Yes was low and needs it to be up before her prolia injection.  Would like to recheck and may need to reschedule her appointment  
6

## 2024-10-16 ENCOUNTER — APPOINTMENT (OUTPATIENT)
Dept: INFUSION THERAPY | Facility: CLINIC | Age: 74
End: 2024-10-16
Payer: MEDICARE

## 2024-10-16 VITALS
TEMPERATURE: 98.3 F | HEART RATE: 71 BPM | WEIGHT: 147 LBS | RESPIRATION RATE: 18 BRPM | BODY MASS INDEX: 27.33 KG/M2 | DIASTOLIC BLOOD PRESSURE: 82 MMHG | SYSTOLIC BLOOD PRESSURE: 138 MMHG | OXYGEN SATURATION: 97 %

## 2024-10-16 DIAGNOSIS — M81.0 AGE RELATED OSTEOPOROSIS, UNSPECIFIED PATHOLOGICAL FRACTURE PRESENCE: ICD-10-CM

## 2024-10-16 PROCEDURE — 96372 THER/PROPH/DIAG INJ SC/IM: CPT | Performed by: NURSE PRACTITIONER

## 2024-10-16 RX ORDER — DIPHENHYDRAMINE HYDROCHLORIDE 50 MG/ML
50 INJECTION INTRAMUSCULAR; INTRAVENOUS AS NEEDED
OUTPATIENT
Start: 2025-03-31

## 2024-10-16 RX ORDER — EPINEPHRINE 0.3 MG/.3ML
0.3 INJECTION SUBCUTANEOUS EVERY 5 MIN PRN
OUTPATIENT
Start: 2025-03-31

## 2024-10-16 RX ORDER — FAMOTIDINE 10 MG/ML
20 INJECTION INTRAVENOUS ONCE AS NEEDED
OUTPATIENT
Start: 2025-03-31

## 2024-10-16 RX ORDER — ALBUTEROL SULFATE 0.83 MG/ML
3 SOLUTION RESPIRATORY (INHALATION) AS NEEDED
OUTPATIENT
Start: 2025-03-31

## 2024-10-16 ASSESSMENT — ENCOUNTER SYMPTOMS
LEG SWELLING: 0
LIGHT-HEADEDNESS: 0
EXTREMITY WEAKNESS: 0
COUGH: 1
SHORTNESS OF BREATH: 1
DIZZINESS: 0
PALPITATIONS: 0
WHEEZING: 0
NUMBNESS: 0
WOUND: 0

## 2024-10-16 ASSESSMENT — PAIN SCALES - GENERAL: PAINLEVEL_OUTOF10: 5

## 2024-10-16 NOTE — PATIENT INSTRUCTIONS
Today :We administered denosumab.     For:   1. Age related osteoporosis, unspecified pathological fracture presence         Your next appointment is due in:  6 months        Please read the  Medication Guide that was given to you and reviewed during todays visit.     (Tell all doctors including dentists that you are taking this medication)     Go to the emergency room or call 911 if:  -You have signs of allergic reaction:   -Rash, hives, itching.   -Swollen, blistered, peeling skin.   -Swelling of face, lips, mouth, tongue or throat.   -Tightness of chest, trouble breathing, swallowing or talking     Call your doctor:  - If IV / injection site gets red, warm, swollen, itchy or leaks fluid or pus.     (Leave dressing on your IV site for at least 2 hours and keep area clean and dry  - If you get sick or have symptoms of infection or are not feeling well for any reason.    (Wash your hands often, stay away from people who are sick)  - If you have side effects from your medication that do not go away or are bothersome.     (Refer to the teaching your nurse gave you for side effects to call your doctor about)    - Common side effects may include:  stuffy nose, headache, feeling tired, muscle aches, upset stomach  - Before receiving any vaccines     - Call the [facility] at   If:  - You get sick, are on antibiotics, have had a recent vaccine, have surgery or dental work and your doctor wants your visit rescheduled.  - You need to cancel and reschedule your visit for any reason. Call at least 2 days before your visit if you need to cancel.   - Your insurance changes before your next visit.    (We will need to get approval from your new insurance. This can take up to two weeks.)     The [facility] is opened Monday thru Friday. We are closed on weekends and holidays.   Voice mail will take your call if the center is closed. If you leave a message please allow 24 hours for a call back during  weekdays. If you leave a message on a weekend/holiday, we will call you back the next business day.

## 2024-10-16 NOTE — PROGRESS NOTES
[facility]   Infusion Clinic Note   Date: 2024   Name: Elma Celestin  : 1950   MRN: [id number]          Reason for Visit: Injections (Prolia)         Today: We administered denosumab.       Visit Type: INJECTION       Ordered By: Dr Lara       Accompanied by:Self       Diagnosis: Age related osteoporosis, unspecified pathological fracture presence        Allergies:   Allergies as of 10/16/2024 - Reviewed 10/16/2024   Allergen Reaction Noted    Latex, natural rubber Other 2018    Meloxicam Other 2014    Adhesive Rash 2023          Current Medications has a current medication list which includes the following prescription(s): ascorbic acid, calcium carbonate, cholecalciferol, cyanocobalamin, prolia, famotidine, hydroxyzine hcl, ibuprofen, omeprazole, sutab, and triamcinolone.       Vitals:   Vitals:    10/16/24 1439   BP: 138/82   Pulse: 71   Resp: 18   Temp: 36.8 °C (98.3 °F)   SpO2: 97%   Weight: 66.7 kg (147 lb)   PainSc:   5   PainLoc: Back  Comment: lower back and right hip             Infusion Pre-procedure Checklist:   - Allergies reviewed: yes   - Medications reviewed: yes       - Previous reaction to current treatment: no      Assess patient for the concerns below. Document provider notification as appropriate.  - Active or recent infection with/without current antibiotic use: no  - Recent or planned invasive dental work: no  - Recent or planned surgeries: no  - Recently received or plans to receive vaccinations: no  - Has treatment related toxicities: no  - Is pregnant:  n/a      Pain: 5   - Is the pain different from normal: no   - Is your Doctor aware:  yes       Labs: N/A          Fall Risk Screening: Mark Fall Risk  History of Falling, Immediate or Within 3 Months: Yes  Secondary Diagnosis: Yes  Ambulatory Aid: Walks without aid/bedrest/nurse assist  Intravenous Therapy/Heparin Lock: No  Gait/Transferring: Normal/bedrest/immobile  Mental  Status: Oriented to own ability  Flores Fall Risk Score: 40       Review Of Systems:  Review of Systems   Respiratory:  Positive for cough and shortness of breath. Negative for wheezing.         Sinus drainage  SOB on exertion   Cardiovascular:  Negative for chest pain, leg swelling and palpitations.   Skin:  Negative for itching, rash and wound.   Neurological:  Negative for dizziness, extremity weakness, light-headedness and numbness.         ROS completed? Yes      Infusion Readiness:  - Assessment Concerns Related to Infusion: No  - Provider notified: n/a      Document Below Only If Indicated:   New Patient Education:    N/A (returning patient for continuation of therapy. Ongoing education provided as needed.)        Treatment Conditions & Drug Specific Questions:    Denosumab  (PROLIA. XGEVA)    (Unless otherwise specified on patient specific therapy plan):     TREATMENT CONDITIONS:  Unless otherwise specified on patient specific therapy plan HOLD and notify provider prior to proceeding with today's injection if patients:  o Calcium is LESS THAN 8.6 mg/dL OR  Ionized Calcium LESS THAN 1.1 mmol/L  o Recent or planned invasive dental procedure (within 4 weeks)    Lab Results   Component Value Date    CALCIUM 9.0 10/10/2024      Lab Results   Component Value Date    CAION 1.13 04/02/2024       Labs reviewed and patient meets treatment conditions? Yes    DRUG SPECIFIC QUESTIONS:  Is the patient taking calcium and vitamin D? Yes  (Recommended)    Pt Instructed on following risks: (1) hypocalcemia, (2) osteonecrosis of the jaw, (3) atypical femoral fractures, (4) serious infections, and (5) dermatologic reactions?  Yes      REMINDER:  PREGNANCY CATEGORY X DRUG. OBTAIN NEGTATIVE PREGNANCY TEST PRIOR TO FIRST INFUSION FOR WOMEN OF CHILDBEARING ABILITY   REMS DRUG    Recommended Vitals/Observation:  Vitals: Obtain vitals prior to injection.  Observation: Patient may leave immediately following injection.        Weight  Based Drug Calculations:    WEIGHT BASED DRUGS: NOT APPLICABLE / FLAT DOSE          Initiated By: Tamica White, RN

## 2024-11-01 ENCOUNTER — HOSPITAL ENCOUNTER (OUTPATIENT)
Dept: RADIOLOGY | Facility: HOSPITAL | Age: 74
Discharge: HOME | End: 2024-11-01
Payer: MEDICARE

## 2024-11-01 DIAGNOSIS — K86.2 PANCREATIC CYST (HHS-HCC): ICD-10-CM

## 2024-11-01 PROCEDURE — A9575 INJ GADOTERATE MEGLUMI 0.1ML: HCPCS | Performed by: PHYSICIAN ASSISTANT

## 2024-11-01 PROCEDURE — 74183 MRI ABD W/O CNTR FLWD CNTR: CPT

## 2024-11-01 PROCEDURE — 2550000001 HC RX 255 CONTRASTS: Performed by: PHYSICIAN ASSISTANT

## 2024-11-01 RX ORDER — GADOTERATE MEGLUMINE 376.9 MG/ML
15 INJECTION INTRAVENOUS
Status: COMPLETED | OUTPATIENT
Start: 2024-11-01 | End: 2024-11-01

## 2024-11-02 ENCOUNTER — OFFICE VISIT (OUTPATIENT)
Dept: URGENT CARE | Age: 74
End: 2024-11-02
Payer: MEDICARE

## 2024-11-02 ENCOUNTER — ANCILLARY PROCEDURE (OUTPATIENT)
Dept: URGENT CARE | Age: 74
End: 2024-11-02
Payer: MEDICARE

## 2024-11-02 VITALS
SYSTOLIC BLOOD PRESSURE: 156 MMHG | TEMPERATURE: 97.7 F | BODY MASS INDEX: 26.95 KG/M2 | RESPIRATION RATE: 16 BRPM | HEART RATE: 70 BPM | DIASTOLIC BLOOD PRESSURE: 90 MMHG | OXYGEN SATURATION: 97 % | WEIGHT: 145 LBS

## 2024-11-02 DIAGNOSIS — M25.561 ACUTE PAIN OF RIGHT KNEE: Primary | ICD-10-CM

## 2024-11-02 DIAGNOSIS — M25.561 ACUTE PAIN OF RIGHT KNEE: ICD-10-CM

## 2024-11-02 PROCEDURE — 73562 X-RAY EXAM OF KNEE 3: CPT | Mod: RIGHT SIDE | Performed by: NURSE PRACTITIONER

## 2024-11-02 RX ORDER — CELECOXIB 100 MG/1
100 CAPSULE ORAL 2 TIMES DAILY
Qty: 20 CAPSULE | Refills: 0 | Status: SHIPPED | OUTPATIENT
Start: 2024-11-02 | End: 2024-11-12

## 2024-11-02 RX ORDER — KETOROLAC TROMETHAMINE 30 MG/ML
30 INJECTION, SOLUTION INTRAMUSCULAR; INTRAVENOUS ONCE
Status: COMPLETED | OUTPATIENT
Start: 2024-11-02 | End: 2024-11-02

## 2024-11-02 ASSESSMENT — ENCOUNTER SYMPTOMS
JOINT SWELLING: 1
CARDIOVASCULAR NEGATIVE: 1
LEG PAIN: 1
HEMATOLOGIC/LYMPHATIC NEGATIVE: 1
PSYCHIATRIC NEGATIVE: 1
CONSTITUTIONAL NEGATIVE: 1
GASTROINTESTINAL NEGATIVE: 1
EYES NEGATIVE: 1
ARTHRALGIAS: 1
ALLERGIC/IMMUNOLOGIC NEGATIVE: 1
RESPIRATORY NEGATIVE: 1
ENDOCRINE NEGATIVE: 1

## 2024-11-20 ENCOUNTER — TELEMEDICINE (OUTPATIENT)
Dept: SURGICAL ONCOLOGY | Facility: CLINIC | Age: 74
End: 2024-11-20
Payer: MEDICARE

## 2024-11-20 DIAGNOSIS — K86.2 PANCREATIC CYST (HHS-HCC): Primary | ICD-10-CM

## 2024-11-20 PROCEDURE — 99441 PR PHYS/QHP TELEPHONE EVALUATION 5-10 MIN: CPT | Performed by: PHYSICIAN ASSISTANT

## 2024-11-20 NOTE — PROGRESS NOTES
A telephone visit (audio connection only) between the patient (at the originating site) and the provider (at the distant site) was utilized to provide this telehealth service.    Verbal consent was requested and obtained from the patient immediately prior to the telehealth visit.     Subjective   Ms. Celestin is a 74-year-old female who is here to discuss surveillance MRI/MRCP for a pancreatic cyst.      She had a renal ultrasound in 2018 which demonstrated an incidental finding of an indeterminate liver lesion. She subsequently had an MRI Liver that proved this lesion to be a hemangioma. She was also found to have a 1.0 cm pancreatic body cyst.     I saw her in October of 2022 and recommended annual surveillance.      MRI/MRCP from 11/04/24 demonstrates increase in the size of a pancreatic cyst in the body measuring 1.3 cm x 0.9 cm (previously 1.0 cm x 0.6 cm) with additional smaller subcentimeter cysts with no MPD dilatation.     She has been busy recently dealing with her 's health issues. He's required 2 additional surgeries following a knee replacement and is now in a nursing facility as he is non-weight bearing/in an immobilizer.     She denies abdominal pain, early satiety, poor appetite, jaundice or unintentional weight loss.     HA1c is 5.7%.     She did have some trouble with the MRI this time around; she has bilateral hip replacements and had pulled a muscle in her calf prior; she became uncomfortable and may have been shifting 30 minutes in.      Medical history and surgical history: Thyroid cancer s/p partial thyroidectomy, s/p bilateral total hip replacement, fibroids s/p hysterectomy.  Family history: Maternal uncle had gastric cancer. Mother had leukemia.   Social history: Former smoker. Occasional alcohol use. No illicits.       Objective     There were no vitals taken for this visit.     Physical Exam  A physical exam was not conducted as this was a phone or virtual visit. The patient is in no  acute distress.     Assessment/Plan   Ms. Celestin is a 74-year-old female who is here to discuss surveillance MRI/MRCP for a pancreatic cyst.     PLAN: She has multifocal branch duct IPMNs. There has been growth (2 to 3 mm) since her last MRI/MRCP 1 year ago. I otherwise see no high risk stigmata or worrisome changes. Therefore, I recommend continued surveillance with MRI/MRCP in 1 year. I will notify her when she is due.      Emily Chan PA-C

## 2024-11-22 ENCOUNTER — APPOINTMENT (OUTPATIENT)
Dept: SURGICAL ONCOLOGY | Facility: CLINIC | Age: 74
End: 2024-11-22
Payer: MEDICARE

## 2024-12-02 ENCOUNTER — APPOINTMENT (OUTPATIENT)
Dept: OTOLARYNGOLOGY | Facility: CLINIC | Age: 74
End: 2024-12-02
Payer: MEDICARE

## 2024-12-02 ENCOUNTER — HOSPITAL ENCOUNTER (OUTPATIENT)
Dept: RADIOLOGY | Facility: HOSPITAL | Age: 74
Discharge: HOME | End: 2024-12-02
Payer: MEDICARE

## 2024-12-02 VITALS — TEMPERATURE: 97 F | BODY MASS INDEX: 26.68 KG/M2 | HEIGHT: 62 IN | WEIGHT: 145 LBS

## 2024-12-02 DIAGNOSIS — J34.2 DEVIATED NASAL SEPTUM: ICD-10-CM

## 2024-12-02 DIAGNOSIS — E04.1 THYROID NODULE: ICD-10-CM

## 2024-12-02 DIAGNOSIS — C73 PAPILLARY THYROID CARCINOMA (MULTI): Primary | ICD-10-CM

## 2024-12-02 DIAGNOSIS — R04.0 EPISTAXIS: ICD-10-CM

## 2024-12-02 DIAGNOSIS — M81.0 AGE RELATED OSTEOPOROSIS, UNSPECIFIED PATHOLOGICAL FRACTURE PRESENCE: ICD-10-CM

## 2024-12-02 PROCEDURE — 30903 CONTROL OF NOSEBLEED: CPT | Performed by: OTOLARYNGOLOGY

## 2024-12-02 PROCEDURE — 77080 DXA BONE DENSITY AXIAL: CPT | Performed by: RADIOLOGY

## 2024-12-02 PROCEDURE — 77080 DXA BONE DENSITY AXIAL: CPT

## 2024-12-02 PROCEDURE — 1036F TOBACCO NON-USER: CPT | Performed by: OTOLARYNGOLOGY

## 2024-12-02 PROCEDURE — 1159F MED LIST DOCD IN RCRD: CPT | Performed by: OTOLARYNGOLOGY

## 2024-12-02 PROCEDURE — 99213 OFFICE O/P EST LOW 20 MIN: CPT | Performed by: OTOLARYNGOLOGY

## 2024-12-02 NOTE — PROGRESS NOTES
Chief Complaint   Patient presents with    Follow-up     LOV 10/23 YEARLY THYROID CK U/S IN CHART,  NOSEBLEEDS LEFT OFF AND ON      Date of Evaluation: 12/2/2024   HPI  Elma Celestin is a 74 y.o. female for follow up on thyroid.  9/5/2024 Ultrasound of right lobe shows sub-centimeter nodule, not meeting TI-RADS criterion. Reports bloody nose last Saturday which recurred several times.    History:  with a remote history of a right papillary thyroid carcinoma status post right total thyroid lobectomy. The left thyroid has been followed with serial ultrasound. She has a 1.6 cm isoechoic thyroid nodule in addition to to stable thyroid nodules noted on previous ultrasound. Fine-needle aspiration April 2022 showed benign cells         Past Medical History:   Diagnosis Date    Abdominal bloating 02/29/2024    Abrasion of left knee 02/29/2024    Acute bronchitis 02/29/2024    Acute upper respiratory infection 02/29/2024    Aftercare following joint replacement surgery 09/13/2019    Orthopedic aftercare for joint replacement    Anemia 02/29/2024    Anesthesia of skin 06/02/2022    Numbness of extremity    Anxiety 06/26/2023    Arthritis     Bursitis of hip 02/29/2024    Cataract     Cough 02/29/2024    Cyst of pancreas ([facility]) 10/24/2018    Pancreatic cyst    Depression     Dizziness 09/20/2023    Discussed changing positions slowly when laying down  Red flags discussed  Happens only when going from laying to sitting    Eczema of both external ears 06/26/2023    Elevated BP without diagnosis of hypertension 06/26/2023    Encounter for screening for malignant neoplasm of respiratory organs 09/25/2019    Encounter for screening for lung cancer    Epigastric pain 09/24/2018    Dyspepsia    Epistaxis 02/29/2024    Excessive thirst 02/29/2024    Fall on same level from stumbling 02/29/2024    Fatigue 02/29/2024    GERD (gastroesophageal reflux disease)     Herpesviral vesicular dermatitis 04/15/2016    Recurrent cold sores     History of thyroid cancer     Hyperglycemia, unspecified 11/28/2022    Stress hyperglycemia    Left shoulder pain 06/26/2023    Local infection of the skin and subcutaneous tissue, unspecified 11/06/2017    Skin infection    Lumbar radiculitis 06/26/2023    Osteoarthritis of hip 06/26/2023    Other bursitis of hip, right hip 07/13/2020    Bursitis of right hip    Other conditions influencing health status 04/22/2019    Antibiotic prophylaxis for dental procedure indicated due to prior joint replacement    Other intervertebral disc displacement, lumbosacral region 12/29/2021    Displacement of lumbosacral intervertebral disc    Overweight 12/29/2021    Overweight with body mass index (BMI) of 27 to 27.9 in adult    Papillary thyroid carcinoma (Multi) 06/26/2023    Personal history of other benign neoplasm 10/24/2018    History of other benign neoplasm    Personal history of other diseases of the musculoskeletal system and connective tissue 11/24/2020    History of osteoporosis    Personal history of other mental and behavioral disorders 07/26/2018    History of depression    Personal history of other specified conditions 09/24/2018    History of urinary frequency    Personal history of other specified conditions 10/07/2020    History of multiple pulmonary nodules    Personal history of urinary (tract) infections     History of urinary tract infection    Presence of right artificial hip joint 03/18/2016    Status post right hip replacement    Primary osteoarthritis of right hip 06/26/2023    Pseudophakia 06/26/2023    Rash and other nonspecific skin eruption 09/25/2019    Skin rash    Rhinitis 06/26/2023    Right leg numbness 06/26/2023    Spondylosis without myelopathy or radiculopathy, sacral and sacrococcygeal region 05/04/2021    Arthropathy of right sacroiliac joint    Stress hyperglycemia 06/26/2023    Tear of left rotator cuff 09/30/2023    Thyroid cancer (Multi) 06/26/2023    Thyroid nodule 06/26/2023    Toxic  effect of venom of bees, accidental (unintentional), initial encounter 07/25/2016    Bee sting reaction, accidental or unintentional, initial encounter    Venous thrombosis 09/30/2023    Yeast infection 02/29/2024    Due to antibiotic use  Stop Monistat  Diflucan 150 mg 1 tablet if no improvement in 3 days may repeat        Past Surgical History:   Procedure Laterality Date    CATARACT EXTRACTION Right 02/28/2023    CATARACT EXTRACTION  04/11/2023    COLONOSCOPY  05/27/2014    Complete Colonoscopy    EYE SURGERY  2023    HYSTERECTOMY  07/12/2013    Hysterectomy    JOINT REPLACEMENT  2014    THYROID SURGERY      TOTAL HIP ARTHROPLASTY  05/27/2014    Total Hip Replacement          Medications:   Current Outpatient Medications   Medication Instructions    ascorbic acid (VITAMIN C) 500 mg, Daily    calcium carbonate (CALCIUM 500 ORAL) 1 each, Daily    cholecalciferol (VITAMIN D3) 50 mcg, Daily    cyanocobalamin (VITAMIN B-12) 50 mcg, Daily    famotidine (Pepcid) 20 mg tablet TAKE 1 TABLET BY MOUTH AS NEEDED FOR HEARTBURN.    hydrOXYzine HCL (ATARAX) 10 mg, oral, 2 times daily PRN    ibuprofen 800 mg, oral, 3 times daily    omeprazole (PRILOSEC) 40 mg, oral, Daily before breakfast, Do not crush or chew.    Prolia 60 mg, Every 6 months    triamcinolone (Kenalog) 0.1 % cream Apply to affected areas twice daily for 2 weeks then daily for 1 week then every other day for 1 week then stop. Then may used as needed when active. When using for maintenance, use less than 14 days per month.        Allergies:  Allergies   Allergen Reactions    Latex, Natural Rubber Other    Meloxicam Other     nose bleeds    Adhesive Rash        Physical Exam:  GENERAL APPEARANCE: Well developed, well nourished and in no acute distress. Conversant with normal vocal quality.    HEAD/FACE: No erythema or edema or facial tenderness. Normal facial nerve function bilaterally.    EXTERNAL EAR: Normal tenderness and external auditory canals without lesion  "or obstructing wax.    MIDDLE EAR: Tympanic membranes intact and mobile with normal landmarks. Middle ears normal and well aerated.    HEARING: Grossly assessment is normal by voice.    NASAL DORSUM: Nontraumatic midline appearance. No skin lesions.    NASAL SEPTUM: Deviated left and nonobstructing. Left  two bright red vessels noted at  Kiesselbach Plexus    INFERIOR TURBINATES: Normal, pink.    NASAL MUCOSA: Pink, normal appearing.    MIDDLE TURBINATES: N/A.    NASAL SECRETIONS: Normal.    TEETH: Normal    FLOOR OF MOUTH: Normal mucosa without lesions or masses.    TONGUE: Normal mucosa without lesions. Normal mobility.    PALATE: Normal hard palate, soft palate, and uvula.    OROPHARYNX: Clear without masses or lesions.    BUCCAL MUCOSA: Normal without lesions or masses..    LIPS: Normal    LARYNX: Not visualized secondary to excessive gagging/enlarged base of tongue.   Last Recorded Vitals  Temperature 36.1 °C (97 °F), height 1.562 m (5' 1.5\"), weight 65.8 kg (145 lb). , Body mass index is 26.95 kg/m².      Elma was seen today for follow-up.  Diagnoses and all orders for this visit:  Papillary thyroid carcinoma (Multi) (Primary)  -     US thyroid; Future  Deviated nasal septum  Epistaxis  Thyroid nodule  -     US thyroid; Future       PLAN  Repeat Thyroid US next year and return in one year. Discussed nose bleed care. She requests cauterization has had to be packed in the past always on left. Both areas of concerned of left Kiesselbach Plexus cauterized. Tolerated well. No bleeding afterward. No nose blowing x10 days and apply neosporin twice a day for three days.       Jackie Harris, APRN-CNP     "

## 2025-01-22 ENCOUNTER — APPOINTMENT (OUTPATIENT)
Dept: RHEUMATOLOGY | Facility: CLINIC | Age: 75
End: 2025-01-22
Payer: MEDICARE

## 2025-01-22 ENCOUNTER — LAB (OUTPATIENT)
Dept: LAB | Facility: LAB | Age: 75
End: 2025-01-22
Payer: MEDICARE

## 2025-01-22 VITALS
WEIGHT: 140 LBS | HEIGHT: 63 IN | SYSTOLIC BLOOD PRESSURE: 144 MMHG | BODY MASS INDEX: 24.8 KG/M2 | OXYGEN SATURATION: 97 % | HEART RATE: 77 BPM | DIASTOLIC BLOOD PRESSURE: 83 MMHG

## 2025-01-22 DIAGNOSIS — M81.0 AGE RELATED OSTEOPOROSIS, UNSPECIFIED PATHOLOGICAL FRACTURE PRESENCE: ICD-10-CM

## 2025-01-22 DIAGNOSIS — M81.0 AGE RELATED OSTEOPOROSIS, UNSPECIFIED PATHOLOGICAL FRACTURE PRESENCE: Primary | ICD-10-CM

## 2025-01-22 DIAGNOSIS — M25.649 STIFFNESS OF HAND JOINT, UNSPECIFIED LATERALITY: ICD-10-CM

## 2025-01-22 DIAGNOSIS — R20.0 NUMBNESS OF TOES: ICD-10-CM

## 2025-01-22 LAB
CRP SERPL-MCNC: 0.11 MG/DL
ERYTHROCYTE [SEDIMENTATION RATE] IN BLOOD BY WESTERGREN METHOD: 6 MM/H (ref 0–30)

## 2025-01-22 PROCEDURE — 86431 RHEUMATOID FACTOR QUANT: CPT

## 2025-01-22 PROCEDURE — 86225 DNA ANTIBODY NATIVE: CPT

## 2025-01-22 PROCEDURE — 86038 ANTINUCLEAR ANTIBODIES: CPT

## 2025-01-22 PROCEDURE — 86140 C-REACTIVE PROTEIN: CPT

## 2025-01-22 PROCEDURE — G2211 COMPLEX E/M VISIT ADD ON: HCPCS | Performed by: INTERNAL MEDICINE

## 2025-01-22 PROCEDURE — 1125F AMNT PAIN NOTED PAIN PRSNT: CPT | Performed by: INTERNAL MEDICINE

## 2025-01-22 PROCEDURE — 82306 VITAMIN D 25 HYDROXY: CPT

## 2025-01-22 PROCEDURE — 99213 OFFICE O/P EST LOW 20 MIN: CPT | Performed by: INTERNAL MEDICINE

## 2025-01-22 PROCEDURE — 82607 VITAMIN B-12: CPT

## 2025-01-22 PROCEDURE — 86200 CCP ANTIBODY: CPT

## 2025-01-22 PROCEDURE — 86235 NUCLEAR ANTIGEN ANTIBODY: CPT

## 2025-01-22 PROCEDURE — 85652 RBC SED RATE AUTOMATED: CPT

## 2025-01-22 ASSESSMENT — PAIN SCALES - GENERAL: PAINLEVEL_OUTOF10: 4

## 2025-01-22 ASSESSMENT — PATIENT HEALTH QUESTIONNAIRE - PHQ9
9. THOUGHTS THAT YOU WOULD BE BETTER OFF DEAD, OR OF HURTING YOURSELF: NOT AT ALL
2. FEELING DOWN, DEPRESSED OR HOPELESS: MORE THAN HALF THE DAYS
4. FEELING TIRED OR HAVING LITTLE ENERGY: NEARLY EVERY DAY
5. POOR APPETITE OR OVEREATING: NEARLY EVERY DAY
6. FEELING BAD ABOUT YOURSELF - OR THAT YOU ARE A FAILURE OR HAVE LET YOURSELF OR YOUR FAMILY DOWN: SEVERAL DAYS
SUM OF ALL RESPONSES TO PHQ QUESTIONS 1-9: 17
7. TROUBLE CONCENTRATING ON THINGS, SUCH AS READING THE NEWSPAPER OR WATCHING TELEVISION: NEARLY EVERY DAY
8. MOVING OR SPEAKING SO SLOWLY THAT OTHER PEOPLE COULD HAVE NOTICED. OR THE OPPOSITE, BEING SO FIGETY OR RESTLESS THAT YOU HAVE BEEN MOVING AROUND A LOT MORE THAN USUAL: SEVERAL DAYS
3. TROUBLE FALLING OR STAYING ASLEEP OR SLEEPING TOO MUCH: NEARLY EVERY DAY
SUM OF ALL RESPONSES TO PHQ9 QUESTIONS 1 AND 2: 3
1. LITTLE INTEREST OR PLEASURE IN DOING THINGS: SEVERAL DAYS

## 2025-01-22 ASSESSMENT — ENCOUNTER SYMPTOMS
DEPRESSION: 1
OCCASIONAL FEELINGS OF UNSTEADINESS: 1
LOSS OF SENSATION IN FEET: 1

## 2025-01-22 NOTE — PROGRESS NOTES
Subjective   Patient ID: Elma Celestin is a 74 y.o. female who presents for Follow-up.  HPI  Patient with history of osteoporosis.  Previously had been on alendronate and had GI issues as well as noncompliant due to her GI issues.  Previously was unable to get Evenity.  DEXA 12/2/2024 lumbar spine -2.4, left forearm 1/3-2.3  DEXA 12/2/2022 lumbar spine -3.2, left forearm 1/3-4.3    I had last seen her in November 2022.  She continues to be on Prolia and receiving it through the infusion center. Her last injection was in October.  She unfortunately had injured her right knee mis-stepping.  She had gone to the urgent care in November but x-ray was negative.  She did see a specialist and they suspect that she may have meniscus tear.  Her  will be going in for knee surgery next week and needs to be well for him.  Sometimes she feels like everything will hurt after Prolia injection and feels more swollen/bloated in general.  She does feel that her fingers are more clumsy and drops things often.  Has numbness in the right foot and then started getting in the left foot.  Had been seen by podiatry and they thought it might be coming from her back.    Denies any dental issues    Review of Systems   Musculoskeletal:         Right knee pain       Objective   Physical Exam  GEN: NAD A&O x3 appropriate affect  EYES:  no conjunctival redness, eyelids normal  SKIN: no rashes, ulcers, or subcutaneous nodules  PULSES: +radials  TENDER POINTS: 0/18   MSK:  Right knee in the brace  Has some mild tenderness in her PIPs and MCPs on palpation.  No clear swelling.  Assessment/Plan     Osteoporosis previously had been on alendronate and had GI issues.   -Currently on Prolia and she has had improvement as can be seen in her most recent bone density December 2, 2024.  Will have her continue with Prolia.    She has been getting some increase in finger symptoms and has had some stiffness that sometimes can last for hours in her hands.   Also had right knee injury.  Has neuropathy symptoms in her feet.  Will do blood work concerning neuropathy symptoms and also blood work for arthralgias.       Jing Arriaga MD 01/22/25 2:35 PM

## 2025-01-23 LAB
25(OH)D3 SERPL-MCNC: 36 NG/ML (ref 30–100)
ANA SER QL HEP2 SUBST: NEGATIVE
CCP IGG SERPL-ACNC: <1 U/ML
CENTROMERE B AB SER-ACNC: <0.2 AI
CHROMATIN AB SERPL-ACNC: <0.2 AI
DSDNA AB SER-ACNC: <1 IU/ML
ENA JO1 AB SER QL IA: <0.2 AI
ENA RNP AB SER IA-ACNC: <0.2 AI
ENA SCL70 AB SER QL IA: <0.2 AI
ENA SM AB SER IA-ACNC: <0.2 AI
ENA SM+RNP AB SER QL IA: <0.2 AI
ENA SS-A AB SER IA-ACNC: <0.2 AI
ENA SS-B AB SER IA-ACNC: <0.2 AI
RHEUMATOID FACT SER NEPH-ACNC: <10 IU/ML (ref 0–15)
RIBOSOMAL P AB SER-ACNC: <0.2 AI
VIT B12 SERPL-MCNC: 768 PG/ML (ref 211–911)

## 2025-02-10 ENCOUNTER — OFFICE VISIT (OUTPATIENT)
Dept: ORTHOPEDIC SURGERY | Facility: CLINIC | Age: 75
End: 2025-02-10
Payer: MEDICARE

## 2025-02-10 ENCOUNTER — HOSPITAL ENCOUNTER (OUTPATIENT)
Dept: RADIOLOGY | Facility: CLINIC | Age: 75
Discharge: HOME | End: 2025-02-10
Payer: MEDICARE

## 2025-02-10 DIAGNOSIS — M17.11 PRIMARY OSTEOARTHRITIS OF RIGHT KNEE: Primary | ICD-10-CM

## 2025-02-10 DIAGNOSIS — M25.561 RIGHT KNEE PAIN, UNSPECIFIED CHRONICITY: ICD-10-CM

## 2025-02-10 PROCEDURE — 20610 DRAIN/INJ JOINT/BURSA W/O US: CPT | Mod: RT | Performed by: STUDENT IN AN ORGANIZED HEALTH CARE EDUCATION/TRAINING PROGRAM

## 2025-02-10 PROCEDURE — 2500000004 HC RX 250 GENERAL PHARMACY W/ HCPCS (ALT 636 FOR OP/ED): Performed by: STUDENT IN AN ORGANIZED HEALTH CARE EDUCATION/TRAINING PROGRAM

## 2025-02-10 PROCEDURE — 1159F MED LIST DOCD IN RCRD: CPT | Performed by: STUDENT IN AN ORGANIZED HEALTH CARE EDUCATION/TRAINING PROGRAM

## 2025-02-10 PROCEDURE — 99214 OFFICE O/P EST MOD 30 MIN: CPT | Mod: 25 | Performed by: STUDENT IN AN ORGANIZED HEALTH CARE EDUCATION/TRAINING PROGRAM

## 2025-02-10 PROCEDURE — 99204 OFFICE O/P NEW MOD 45 MIN: CPT | Performed by: STUDENT IN AN ORGANIZED HEALTH CARE EDUCATION/TRAINING PROGRAM

## 2025-02-10 PROCEDURE — L1812 KO ELASTIC W/JOINTS PRE OTS: HCPCS | Performed by: STUDENT IN AN ORGANIZED HEALTH CARE EDUCATION/TRAINING PROGRAM

## 2025-02-10 PROCEDURE — 73564 X-RAY EXAM KNEE 4 OR MORE: CPT | Mod: RIGHT SIDE | Performed by: RADIOLOGY

## 2025-02-10 PROCEDURE — 73564 X-RAY EXAM KNEE 4 OR MORE: CPT | Mod: RT

## 2025-02-10 RX ORDER — TRIAMCINOLONE ACETONIDE 40 MG/ML
40 INJECTION, SUSPENSION INTRA-ARTICULAR; INTRAMUSCULAR
Status: COMPLETED | OUTPATIENT
Start: 2025-02-10 | End: 2025-02-10

## 2025-02-10 RX ORDER — LIDOCAINE HYDROCHLORIDE 10 MG/ML
4 INJECTION, SOLUTION INFILTRATION; PERINEURAL
Status: COMPLETED | OUTPATIENT
Start: 2025-02-10 | End: 2025-02-10

## 2025-02-10 RX ADMIN — TRIAMCINOLONE ACETONIDE 40 MG: 200 INJECTION, SUSPENSION INTRA-ARTICULAR; INTRAMUSCULAR at 14:31

## 2025-02-10 RX ADMIN — LIDOCAINE HYDROCHLORIDE 4 ML: 10 INJECTION, SOLUTION INFILTRATION; PERINEURAL at 14:31

## 2025-02-10 ASSESSMENT — PAIN - FUNCTIONAL ASSESSMENT: PAIN_FUNCTIONAL_ASSESSMENT: 0-10

## 2025-02-10 ASSESSMENT — PAIN DESCRIPTION - DESCRIPTORS: DESCRIPTORS: ACHING;TENDER

## 2025-02-10 ASSESSMENT — PAIN SCALES - GENERAL: PAINLEVEL_OUTOF10: 5 - MODERATE PAIN

## 2025-02-10 NOTE — PROGRESS NOTES
Elma Celestin  is a 74 y.o. year-old  female. she  is a new patient to our office and presents with a chief complaint of Right  knee pain.  Is been ongoing for some time.  Reports that on the medial aspect.  She did have degenerative has been worsening months with her  recently underwent cystoscopy.  Since symptoms are negative she is in provider.  Does not trust her knee.  Some swelling.  No popping.      Past Medical, Family, and Social History reviewed   Review of Systems  A complete review of systems was conducted, pertinent only to the HPI noted above.  Constitutional: None  Eyes: No additions to above history  Ears, Nose, Throat: No additions to above history  Cardiovascular: No additions to above history  Respiratory: No additions to above history  GI: No additions to above history  : No additions to above history  Skin/Neuro: No additions to above history  Endocrine/Heme/Lymph: No additions to above history  Immunologic: No additions to above history  Psychiatric: No additions to above history  Musculoskeletal: see above    GEN: Alert and Oriented x 3  Constitutional: Well appearing , in no apparent distress.  Skin: No rashes, erythema, or induration around knee    MUSCULOSKELETAL EXAM:     Right KNEE:  ROM: 5/0/130  Effusion: Small  Alignment: [neutral]      Gait: [normal]  Sensation intact bilaterally sural/saphenous/sp/dp/tibial nerve bilaterally  Motor 5/5 knee flexion/extension/foot DF/PF/EHL/FHL bilaterally  Palpable/symmetric DP and PT pulse bilaterally      PATELLAR/EXTENSOR MECHANISM:   KNEE:  Patellar Crepitus: n  Patellar Compression Pain:n  Patellar Apprehension: [no]  Extensor Mechanism: [intact]  Straight Leg Raise: good      LIGAMENTS:  ACL: Lachmans: [negative]  PCL: [stable]  Valgus at 0 degrees: [stable]  Valgus at 30 degrees: [stable]  Varus at 0 degrees: [stable]  Varus at 30 degrees: [stable]    MENISCUS EXAM:  Joint Line Tenderness:medial joint line tenderness  McMurrays:  [negative]  Pain with Deep Flexion: [No]    Xrays independently viewed and interpreted: Mild medial compartment joint space    L Inj/Asp: R knee on 2/10/2025 2:31 PM  Indications: pain  Details: 21 G needle, anterolateral approach  Medications: 40 mg triamcinolone acetonide 40 mg/mL; 4 mL lidocaine 10 mg/mL (1 %)  Outcome: tolerated well, no immediate complications  Procedure, treatment alternatives, risks and benefits explained, specific risks discussed. Consent was given by the patient. Immediately prior to procedure a time out was called to verify the correct patient, procedure, equipment, support staff and site/side marked as required. Patient was prepped and draped in the usual sterile fashion.       Patient was prescribed a [hinged knee brace] for [knee osteoarthritis].The patient is ambulatory with or without aid; but, has weakness, instability and/or deformity of their [Right  knee which requires stabilization from this orthosis to improve their function. Verbal and written instructions for the use, wear schedule, cleaning and application of this item were given.  Patient was instructed that should the brace result in increased pain, decreased sensation, increased swelling, or an overall worsening of their medical condition, to please contact our office immediately. Orthotic management and training was provided for skin care, modifications due to healing tissues, edema changes, interruption in skin integrity, and safety precautions with the orthosis.      The patient history, physical examination and imaging studies are consistent with knee arthritis vs degenerative mensicus tear. The treatment options including NSAIDs, activity modification, PT (including the importance of obtaining full motion), and weight loss were discussed at length today. I have also suggested a potential for IA injection with cortisone and have provided today at her  request. I have discussed the potential need for MRI in the future  if pain does not improve. The patient acknowledged the current plan.

## 2025-02-19 ASSESSMENT — SLIT LAMP EXAM - LIDS
COMMENTS: GOOD POSITION
COMMENTS: GOOD POSITION

## 2025-02-19 ASSESSMENT — EXTERNAL EXAM - LEFT EYE: OS_EXAM: NORMAL

## 2025-02-19 ASSESSMENT — CUP TO DISC RATIO
OS_RATIO: 0.3
OD_RATIO: 0.35

## 2025-02-19 ASSESSMENT — EXTERNAL EXAM - RIGHT EYE: OD_EXAM: NORMAL

## 2025-02-19 NOTE — PROGRESS NOTES
RPE mottling, left eye  -OCT macula (2/25/25) - Normal thickness and contour OU. Outer retinal disruption nasal to fovea OS. No edema OU. 256/245. Stable from 2/27/24.   -Unclear etiology, no history of eye trauma, sun gazing, laser pointer injury.   -Monitor for now. Plan to repeat comprehensive exam with OCT macula in 1 year.    Posterior capsular opacification, left eye  Pseudophakia  -S/p phacoemulsification/IOL OD 2/28/23 OS 4/11/23 ([facility])  -Mild PCO OS, worsening vision. Symptoms: Gradual worsening of vision x few months. Harder to read small print. More difficulty seeing small words/numbers on TV. Having more trouble seeing road signs when driving. Glare from headlights when driving at night. Discussed exam finding with patient. Discussed option of YAG laser capsulotomy. Patient would like to proceed. F/u next available YAG laser capsulotomy OS - refract, glare/BAT, dilate OS only.     Presbyopia  -Continue current glasses, may also use OTC reading glasses.  -New Rx for glasses given per patient request - optional to fill. Patient's signature obtained to acknowledge and confirm that a paper copy of glasses Rx was given to patient in compliance with [facility] Eyeglass Rule. Electronic copy of Rx will also be available via [employer].         No FH of AMD/glaucoma

## 2025-02-25 ENCOUNTER — APPOINTMENT (OUTPATIENT)
Dept: OPHTHALMOLOGY | Facility: CLINIC | Age: 75
End: 2025-02-25
Payer: MEDICARE

## 2025-02-25 DIAGNOSIS — H35.89 RPE MOTTLING OF MACULA: Primary | ICD-10-CM

## 2025-02-25 DIAGNOSIS — H26.492 PCO (POSTERIOR CAPSULAR OPACIFICATION), LEFT: ICD-10-CM

## 2025-02-25 DIAGNOSIS — Z96.1 PSEUDOPHAKIA: ICD-10-CM

## 2025-02-25 DIAGNOSIS — H52.4 PRESBYOPIA: ICD-10-CM

## 2025-02-25 PROCEDURE — 92134 CPTRZ OPH DX IMG PST SGM RTA: CPT | Performed by: OPHTHALMOLOGY

## 2025-02-25 PROCEDURE — 92015 DETERMINE REFRACTIVE STATE: CPT | Performed by: OPHTHALMOLOGY

## 2025-02-25 PROCEDURE — 92014 COMPRE OPH EXAM EST PT 1/>: CPT | Performed by: OPHTHALMOLOGY

## 2025-02-25 RX ORDER — BUPROPION HYDROCHLORIDE 100 MG/1
1 TABLET, EXTENDED RELEASE ORAL
COMMUNITY
Start: 2025-02-12

## 2025-02-25 ASSESSMENT — REFRACTION_MANIFEST
OD_ADD: +2.50
OD_AXIS: 120
OD_SPHERE: +0.50
OS_SPHERE: PLANO
OS_CYLINDER: SPHERE
OS_ADD: +2.50
OD_CYLINDER: -0.25

## 2025-02-25 ASSESSMENT — ENCOUNTER SYMPTOMS
RESPIRATORY NEGATIVE: 0
CARDIOVASCULAR NEGATIVE: 0
ALLERGIC/IMMUNOLOGIC NEGATIVE: 0
PSYCHIATRIC NEGATIVE: 0
NEUROLOGICAL NEGATIVE: 0
HEMATOLOGIC/LYMPHATIC NEGATIVE: 0
CONSTITUTIONAL NEGATIVE: 0
ENDOCRINE NEGATIVE: 0
GASTROINTESTINAL NEGATIVE: 0
MUSCULOSKELETAL NEGATIVE: 0
EYES NEGATIVE: 1

## 2025-02-25 ASSESSMENT — REFRACTION_WEARINGRX
OD_CYLINDER: -0.25
OS_ADD: +2.50
OD_SPHERE: +0.25
OD_ADD: +2.50
OS_SPHERE: PLANO
OS_CYLINDER: SPHERE
OD_AXIS: 120

## 2025-02-25 ASSESSMENT — VISUAL ACUITY
OS_CC: 20/30
OD_BAT_MED: 20/20
METHOD: SNELLEN - LINEAR
OD_CC: 20/30
OS_BAT_MED: 20/40

## 2025-02-25 ASSESSMENT — TONOMETRY
IOP_METHOD: GOLDMANN APPLANATION
OD_IOP_MMHG: 16
OS_IOP_MMHG: 16

## 2025-02-27 ENCOUNTER — HOSPITAL ENCOUNTER (OUTPATIENT)
Dept: RADIOLOGY | Facility: HOSPITAL | Age: 75
Discharge: HOME | End: 2025-02-27
Payer: MEDICARE

## 2025-02-27 DIAGNOSIS — Z85.850 PERSONAL HISTORY OF MALIGNANT NEOPLASM OF THYROID: ICD-10-CM

## 2025-02-27 PROCEDURE — 71271 CT THORAX LUNG CANCER SCR C-: CPT

## 2025-02-27 PROCEDURE — 71271 CT THORAX LUNG CANCER SCR C-: CPT | Performed by: STUDENT IN AN ORGANIZED HEALTH CARE EDUCATION/TRAINING PROGRAM

## 2025-03-03 ENCOUNTER — APPOINTMENT (OUTPATIENT)
Dept: PRIMARY CARE | Facility: CLINIC | Age: 75
End: 2025-03-03
Payer: MEDICARE

## 2025-03-03 VITALS
BODY MASS INDEX: 24.98 KG/M2 | SYSTOLIC BLOOD PRESSURE: 134 MMHG | HEART RATE: 72 BPM | DIASTOLIC BLOOD PRESSURE: 80 MMHG | WEIGHT: 141 LBS | OXYGEN SATURATION: 98 %

## 2025-03-03 DIAGNOSIS — R73.01 ELEVATED FASTING BLOOD SUGAR: ICD-10-CM

## 2025-03-03 DIAGNOSIS — C73 PAPILLARY THYROID CARCINOMA (MULTI): ICD-10-CM

## 2025-03-03 DIAGNOSIS — R53.83 FATIGUE, UNSPECIFIED TYPE: ICD-10-CM

## 2025-03-03 DIAGNOSIS — R35.1 NOCTURIA: ICD-10-CM

## 2025-03-03 DIAGNOSIS — R06.83 SNORING: ICD-10-CM

## 2025-03-03 DIAGNOSIS — J43.2 CENTRILOBULAR EMPHYSEMA (MULTI): ICD-10-CM

## 2025-03-03 DIAGNOSIS — Z00.00 ROUTINE GENERAL MEDICAL EXAMINATION AT HEALTH CARE FACILITY: Primary | ICD-10-CM

## 2025-03-03 DIAGNOSIS — E78.5 DYSLIPIDEMIA: ICD-10-CM

## 2025-03-03 PROCEDURE — 1123F ACP DISCUSS/DSCN MKR DOCD: CPT | Performed by: FAMILY MEDICINE

## 2025-03-03 PROCEDURE — 1160F RVW MEDS BY RX/DR IN RCRD: CPT | Performed by: FAMILY MEDICINE

## 2025-03-03 PROCEDURE — G0439 PPPS, SUBSEQ VISIT: HCPCS | Performed by: FAMILY MEDICINE

## 2025-03-03 PROCEDURE — G2211 COMPLEX E/M VISIT ADD ON: HCPCS | Performed by: FAMILY MEDICINE

## 2025-03-03 PROCEDURE — 1159F MED LIST DOCD IN RCRD: CPT | Performed by: FAMILY MEDICINE

## 2025-03-03 PROCEDURE — 1158F ADVNC CARE PLAN TLK DOCD: CPT | Performed by: FAMILY MEDICINE

## 2025-03-03 PROCEDURE — 99214 OFFICE O/P EST MOD 30 MIN: CPT | Performed by: FAMILY MEDICINE

## 2025-03-03 PROCEDURE — 99397 PER PM REEVAL EST PAT 65+ YR: CPT | Performed by: FAMILY MEDICINE

## 2025-03-03 PROCEDURE — 1170F FXNL STATUS ASSESSED: CPT | Performed by: FAMILY MEDICINE

## 2025-03-03 PROCEDURE — 1036F TOBACCO NON-USER: CPT | Performed by: FAMILY MEDICINE

## 2025-03-03 ASSESSMENT — ACTIVITIES OF DAILY LIVING (ADL)
BATHING: INDEPENDENT
MANAGING_FINANCES: INDEPENDENT
DRESSING: INDEPENDENT
DOING_HOUSEWORK: INDEPENDENT
TAKING_MEDICATION: INDEPENDENT
GROCERY_SHOPPING: INDEPENDENT

## 2025-03-03 ASSESSMENT — PATIENT HEALTH QUESTIONNAIRE - PHQ9
2. FEELING DOWN, DEPRESSED OR HOPELESS: SEVERAL DAYS
SUM OF ALL RESPONSES TO PHQ9 QUESTIONS 1 AND 2: 2
1. LITTLE INTEREST OR PLEASURE IN DOING THINGS: SEVERAL DAYS

## 2025-03-03 NOTE — PATIENT INSTRUCTIONS
Please get updated fasting blood work to recheck the lipid and thyroid and A1c    Continue to follow up with your specialists, rheumatology, pancreatic, lung, dermatologist, GI, ENT, etc    Continue regular eye and dental examinations     We might want to consider adding on a cholesterol-lowering medications     Continue the wellbutrin     We can consider referral to urogyn once we have check for diabetes and sleep apnea.     Follow up in 6 months for follow up or sooner as needed

## 2025-03-03 NOTE — PROGRESS NOTES
Subjective   Elma Celestin is a 74 y.o. female who presents for Medicare Annual Wellness Visit Subsequent and Annual Exam (Medicare Wellness / CPE).    HPI  Former [facility] patient   Here to est     Some sinus drainage leading to cough   - doing sinus rinse in the past   - also using nasal saline gel     #) Nocturia   - she does snore   - does feel tired all the time.   - last year on a girls trip was told that she snores   - never tested for sleep apnea     #) HTN   - BP today is 134/80   - on Prn NSAIDS     #) GERD -on on famotidine   - EGD 8/8/24 showes slight gastritis   - small hiatal hernia seen on MRCP in nov 2024     #) HLD   - ASCVD risk is 20%  - LDL was 140 in the past, will recheck   - CT calcium score was last on 10/14/19 as a score of 1.0   - not on a statin at this time, but I did recommend it     #) varicose veins     #) Pre diabetes   - A1c was -- recheck now   - uncles and grandmother with diabetes     #) nontoxic goiter- then found to have papillary thyroid carcinoma   - partial thyroidectomy   - follows with Dr Pulido , ENT yearly   - last US was on 9/5/24     #) pancreatic cyst with liver hemangioma   - follows with Emily Chan   - last MRCP pancreas on 11/1/24 -- Increase in size of pancreatic cystic lesions suggestive of side duct IPMN. Recommend 1 year follow-up with pancreas MRI/MRCP.    #) chronic constipation - BM are ok   - Did see Dr Miguel with EGD and colonoscopy on 8/8/24   - did 3 weeks of omeprazole 40 mg and then transitioned to famotidine     #) Chronic low back pain and generalized OA - right knee pain   - right knee xray with dr Huitron - did get a shot on 2/10/25 and brace     #) lung nodules with centrilobular emphysema   - quit smoking about 16 yrs ago   - last CT lung screen on 2/27/25     #) nevus  - regular skin checks with Demetri matamoros     #) Hearing testing last on 10/20/23 - no need for hearing aids     Cataracts     #) osteoporosis   - DEXA last on 12/2/24   -  "follows with Dr Wilder   - on prolia , calcium and vitamin D     #) Stress/ anxiety   -  has had multiple knee surgeries   - on bupropion  mg q am - only for a week or so     ROS was completed and all systems are negative with the exception of what was noted in the the HPI.     Objective     /80   Pulse 72   Wt 64 kg (141 lb)   SpO2 98%   BMI 24.98 kg/m²      Last Labs:     CMP:   Lab Results   Component Value Date    CALCIUM 9.0 10/10/2024    CALCIUM 8.3 (L) 10/02/2024    PROT 6.6 10/02/2024    PROT 6.8 02/08/2024    ALBUMIN 3.9 10/02/2024    ALBUMIN 4.0 02/08/2024    AST 20 10/02/2024    AST 17 02/08/2024    ALKPHOS 49 10/02/2024    ALKPHOS 54 02/08/2024    BILITOT 0.4 10/02/2024    BILITOT 0.5 02/08/2024     CBC:   Lab Results   Component Value Date    WBC 5.6 02/01/2024    WBC 4.8 06/26/2023    WBC 5.2 12/02/2022    HGB 13.2 02/01/2024    HGB 13.3 06/26/2023    HGB 13.2 12/02/2022    HCT 41.7 02/01/2024    HCT 40.8 06/26/2023    HCT 40.8 12/02/2022    MCV 94 02/01/2024    MCV 91 06/26/2023    MCV 92 12/02/2022     02/01/2024     06/26/2023     12/02/2022     A1C:   Lab Results   Component Value Date    HGBA1C 5.7 06/18/2024    HGBA1C 5.7 (A) 06/11/2022    HGBA1C 5.8 06/19/2020     LIPID PANEL:   Lab Results   Component Value Date    CHOL 224 (H) 02/01/2024    CHOL 230 (H) 12/02/2022    CHOL 224 (H) 10/25/2021    TRIG 153 (H) 02/01/2024    TRIG 84 12/02/2022    TRIG 145 10/25/2021    HDL 68.6 02/01/2024    HDL 73.6 12/02/2022    HDL 69.9 10/25/2021    CHHDL 3.3 02/01/2024    CHHDL 3.1 12/02/2022    CHHDL 3.2 10/25/2021    LDLF 140 (H) 12/02/2022    LDLF 125 (H) 10/25/2021    VLDL 31 02/01/2024    VLDL 17 12/02/2022    VLDL 29 10/25/2021    NHDL 155 (H) 02/01/2024     TSH:   Lab Results   Component Value Date    TSH 1.01 10/02/2024    TSH 1.18 02/01/2024     PSA:   No results found for: \"PSA\"    Physical Exam  GEN: A+O, no acute distress  HEENT: NC/AT, Oropharynx clear, " no exudates, TM visualized, Extraoccular muscles intact, no facial droop; no thyromegaly or cervical LAD  RESP: CTAB, no wheezes   CV: RRR, no murmurs  ABD: soft, non-tender, + BS  SKIN: no rashes or bruising, no peripheral edema   NEURO: CN II-XII grossly intact, moves all extremities equally, no tremor   PSYCH: normal affect, appropriate mood     Assessment/Plan   Problem List Items Addressed This Visit    None    Please get updated fasting blood work to recheck the lipid and thyroid and A1c    Continue to follow up with your specialists, rheumatology, pancreatic, lung, dermatologist, GI, ENT, etc    Continue regular eye and dental examinations     We might want to consider adding on a cholesterol-lowering medications     Continue the wellbutrin     We can consider referral to urogyn once we have check for diabetes and sleep apnea.     Follow up in 6 months for follow up or sooner as needed        Laila Alonzo DO, [facility] [place] [place]  [address]   [address]   [place] [address]  Ph. [phone]  Fx. [phone]

## 2025-03-09 LAB
BASOPHILS # BLD AUTO: 93 CELLS/UL (ref 0–200)
BASOPHILS NFR BLD AUTO: 1.5 %
CHOLEST SERPL-MCNC: 227 MG/DL
CHOLEST/HDLC SERPL: 2.8 (CALC)
EOSINOPHIL # BLD AUTO: 211 CELLS/UL (ref 15–500)
EOSINOPHIL NFR BLD AUTO: 3.4 %
ERYTHROCYTE [DISTWIDTH] IN BLOOD BY AUTOMATED COUNT: 12.3 % (ref 11–15)
EST. AVERAGE GLUCOSE BLD GHB EST-MCNC: 123 MG/DL
EST. AVERAGE GLUCOSE BLD GHB EST-SCNC: 6.8 MMOL/L
HBA1C MFR BLD: 5.9 % OF TOTAL HGB
HCT VFR BLD AUTO: 41.4 % (ref 35–45)
HDLC SERPL-MCNC: 81 MG/DL
HGB BLD-MCNC: 13.1 G/DL (ref 11.7–15.5)
LDLC SERPL CALC-MCNC: 130 MG/DL (CALC)
LYMPHOCYTES # BLD AUTO: 1748 CELLS/UL (ref 850–3900)
LYMPHOCYTES NFR BLD AUTO: 28.2 %
MCH RBC QN AUTO: 29.8 PG (ref 27–33)
MCHC RBC AUTO-ENTMCNC: 31.6 G/DL (ref 32–36)
MCV RBC AUTO: 94.1 FL (ref 80–100)
MONOCYTES # BLD AUTO: 378 CELLS/UL (ref 200–950)
MONOCYTES NFR BLD AUTO: 6.1 %
NEUTROPHILS # BLD AUTO: 3770 CELLS/UL (ref 1500–7800)
NEUTROPHILS NFR BLD AUTO: 60.8 %
NONHDLC SERPL-MCNC: 146 MG/DL (CALC)
PLATELET # BLD AUTO: 243 THOUSAND/UL (ref 140–400)
PMV BLD REES-ECKER: 9.6 FL (ref 7.5–12.5)
RBC # BLD AUTO: 4.4 MILLION/UL (ref 3.8–5.1)
TRIGL SERPL-MCNC: 66 MG/DL
WBC # BLD AUTO: 6.2 THOUSAND/UL (ref 3.8–10.8)

## 2025-03-15 ASSESSMENT — SLIT LAMP EXAM - LIDS
COMMENTS: GOOD POSITION
COMMENTS: GOOD POSITION

## 2025-03-15 ASSESSMENT — EXTERNAL EXAM - LEFT EYE: OS_EXAM: NORMAL

## 2025-03-15 ASSESSMENT — EXTERNAL EXAM - RIGHT EYE: OD_EXAM: NORMAL

## 2025-03-15 ASSESSMENT — CUP TO DISC RATIO: OS_RATIO: 0.3

## 2025-03-16 NOTE — PROGRESS NOTES
Posterior capsular opacification, left eye  Pseudophakia  -S/p phacoemulsification/IOL OD 2/28/23 OS 4/11/23 ([facility])  -Visually significant PCO left eye. Symptoms: Gradual worsening of vision x few months. Harder to read small print. More difficulty seeing small words/numbers on TV. Having more trouble seeing road signs when driving. Glare from headlights when driving at night.  Discussed diagnosis with patient and option of YAG capsulotomy. Discussed procedure. Discussed potential risks, benefits, and alternatives, including but not limited to: pain, inflammation, edema, retinal tear/detachment, floaters, increased eye pressure, damage to other ocular structures, damage to/dislocation of lens implant, glare, need to repeat procedure, loss of vision or loss of eye. Patient understands and wishes to proceed. Consent signed.  YAG capsulotomy left eye done 3/18/25. Advised to use artificial tears PRN. F/u 6-8 weeks - refract OU, dilate OS. D/w patient may need to repeat laser to enlarge opening in the future as needed. Call or return sooner if any redness, pain, decreased vision, flashes, or floaters.   Pulse = 25  TE = 20  Power = 0.8 mJ    RPE mottling, left eye  -OCT macula (2/25/25) - Normal thickness and contour OU. Outer retinal disruption nasal to fovea OS. No edema OU. 256/245. Stable from 2/27/24.   -Unclear etiology, no history of eye trauma, sun gazing, laser pointer injury.   -Monitor for now. Plan to repeat comprehensive exam with OCT macula in 1 year.    Presbyopia  -Continue current glasses, may also use OTC reading glasses.  -Rx given and confirmation signature obtained 2/25/25  -Refraction performed today for diagnostic purposes only and without specific intent to dispense Rx. Glasses Rx not dispensed today.         No FH of AMD/glaucoma

## 2025-03-18 ENCOUNTER — PROCEDURE VISIT (OUTPATIENT)
Dept: SLEEP MEDICINE | Facility: HOSPITAL | Age: 75
End: 2025-03-18
Payer: MEDICARE

## 2025-03-18 ENCOUNTER — APPOINTMENT (OUTPATIENT)
Dept: OPHTHALMOLOGY | Facility: CLINIC | Age: 75
End: 2025-03-18
Payer: MEDICARE

## 2025-03-18 DIAGNOSIS — Z96.1 PSEUDOPHAKIA: ICD-10-CM

## 2025-03-18 DIAGNOSIS — H52.4 PRESBYOPIA: ICD-10-CM

## 2025-03-18 DIAGNOSIS — R35.1 NOCTURIA: ICD-10-CM

## 2025-03-18 DIAGNOSIS — H26.492 PCO (POSTERIOR CAPSULAR OPACIFICATION), LEFT: Primary | ICD-10-CM

## 2025-03-18 DIAGNOSIS — H35.89 RPE MOTTLING OF MACULA: ICD-10-CM

## 2025-03-18 DIAGNOSIS — R53.83 FATIGUE, UNSPECIFIED TYPE: ICD-10-CM

## 2025-03-18 DIAGNOSIS — R06.83 SNORING: ICD-10-CM

## 2025-03-18 PROCEDURE — 99214 OFFICE O/P EST MOD 30 MIN: CPT | Performed by: OPHTHALMOLOGY

## 2025-03-18 PROCEDURE — 66821 AFTER CATARACT LASER SURGERY: CPT | Mod: LEFT SIDE | Performed by: OPHTHALMOLOGY

## 2025-03-18 PROCEDURE — 95806 SLEEP STUDY UNATT&RESP EFFT: CPT | Performed by: ALLERGY & IMMUNOLOGY

## 2025-03-18 RX ORDER — ROSUVASTATIN CALCIUM 5 MG/1
TABLET, COATED ORAL
COMMUNITY
Start: 2025-03-17

## 2025-03-18 ASSESSMENT — REFRACTION_WEARINGRX
OS_SPHERE: PLANO
OS_CYLINDER: SPHERE
OD_CYLINDER: -0.25
OD_AXIS: 120
OD_ADD: +2.50
OS_ADD: +2.50
OD_SPHERE: +0.25

## 2025-03-18 ASSESSMENT — ENCOUNTER SYMPTOMS
HEMATOLOGIC/LYMPHATIC NEGATIVE: 0
RESPIRATORY NEGATIVE: 0
PSYCHIATRIC NEGATIVE: 0
ENDOCRINE NEGATIVE: 0
GASTROINTESTINAL NEGATIVE: 0
ALLERGIC/IMMUNOLOGIC NEGATIVE: 0
MUSCULOSKELETAL NEGATIVE: 0
CARDIOVASCULAR NEGATIVE: 0
NEUROLOGICAL NEGATIVE: 0
CONSTITUTIONAL NEGATIVE: 0
EYES NEGATIVE: 1

## 2025-03-18 ASSESSMENT — TONOMETRY
OD_IOP_MMHG: 16
OS_IOP_MMHG: 16
IOP_METHOD: GOLDMANN APPLANATION

## 2025-03-18 ASSESSMENT — VISUAL ACUITY
OD_CC: 20/20
OS_BAT_MED: 20/40
OS_CC: 20/30
METHOD: SNELLEN - LINEAR
CORRECTION_TYPE: GLASSES

## 2025-03-18 ASSESSMENT — REFRACTION_MANIFEST
OD_AXIS: 120
OD_SPHERE: +0.25
OS_ADD: +2.50
OD_CYLINDER: -0.25
OS_SPHERE: PLANO
OS_CYLINDER: SPHERE
OD_ADD: +2.50

## 2025-03-27 ENCOUNTER — OFFICE VISIT (OUTPATIENT)
Dept: URGENT CARE | Age: 75
End: 2025-03-27
Payer: MEDICARE

## 2025-03-27 ENCOUNTER — APPOINTMENT (OUTPATIENT)
Dept: RADIOLOGY | Facility: HOSPITAL | Age: 75
End: 2025-03-27
Payer: MEDICARE

## 2025-03-27 ENCOUNTER — HOSPITAL ENCOUNTER (EMERGENCY)
Facility: HOSPITAL | Age: 75
Discharge: HOME | End: 2025-03-27
Attending: STUDENT IN AN ORGANIZED HEALTH CARE EDUCATION/TRAINING PROGRAM
Payer: MEDICARE

## 2025-03-27 VITALS
WEIGHT: 140 LBS | HEART RATE: 68 BPM | HEIGHT: 63 IN | SYSTOLIC BLOOD PRESSURE: 155 MMHG | TEMPERATURE: 97.7 F | DIASTOLIC BLOOD PRESSURE: 72 MMHG | RESPIRATION RATE: 16 BRPM | OXYGEN SATURATION: 100 % | BODY MASS INDEX: 24.8 KG/M2

## 2025-03-27 VITALS
TEMPERATURE: 97.2 F | RESPIRATION RATE: 16 BRPM | OXYGEN SATURATION: 98 % | WEIGHT: 140 LBS | SYSTOLIC BLOOD PRESSURE: 143 MMHG | DIASTOLIC BLOOD PRESSURE: 61 MMHG | HEART RATE: 62 BPM | BODY MASS INDEX: 24.8 KG/M2

## 2025-03-27 DIAGNOSIS — R10.9 ACUTE ABDOMINAL PAIN: Primary | ICD-10-CM

## 2025-03-27 DIAGNOSIS — N13.30 HYDROURETERONEPHROSIS: ICD-10-CM

## 2025-03-27 DIAGNOSIS — N20.1 URETEROLITHIASIS: ICD-10-CM

## 2025-03-27 DIAGNOSIS — R10.9 ACUTE FLANK PAIN: ICD-10-CM

## 2025-03-27 DIAGNOSIS — R10.9 ABDOMINAL PAIN, UNSPECIFIED ABDOMINAL LOCATION: Primary | ICD-10-CM

## 2025-03-27 DIAGNOSIS — N17.9 AKI (ACUTE KIDNEY INJURY): ICD-10-CM

## 2025-03-27 LAB
ALBUMIN SERPL BCP-MCNC: 4.4 G/DL (ref 3.4–5)
ALP SERPL-CCNC: 47 U/L (ref 33–136)
ALT SERPL W P-5'-P-CCNC: 18 U/L (ref 7–45)
ANION GAP SERPL CALCULATED.3IONS-SCNC: 11 MMOL/L (ref 10–20)
APPEARANCE UR: CLEAR
AST SERPL W P-5'-P-CCNC: 20 U/L (ref 9–39)
BACTERIA #/AREA URNS AUTO: ABNORMAL /HPF
BASOPHILS # BLD AUTO: 0.08 X10*3/UL (ref 0–0.1)
BASOPHILS NFR BLD AUTO: 0.8 %
BILIRUB SERPL-MCNC: 0.5 MG/DL (ref 0–1.2)
BILIRUB UR STRIP.AUTO-MCNC: NEGATIVE MG/DL
BUN SERPL-MCNC: 16 MG/DL (ref 6–23)
CALCIUM SERPL-MCNC: 9.1 MG/DL (ref 8.6–10.3)
CHLORIDE SERPL-SCNC: 102 MMOL/L (ref 98–107)
CO2 SERPL-SCNC: 26 MMOL/L (ref 21–32)
COLOR UR: ABNORMAL
CREAT SERPL-MCNC: 1.1 MG/DL (ref 0.5–1.05)
EGFRCR SERPLBLD CKD-EPI 2021: 53 ML/MIN/1.73M*2
EOSINOPHIL # BLD AUTO: 0.03 X10*3/UL (ref 0–0.4)
EOSINOPHIL NFR BLD AUTO: 0.3 %
ERYTHROCYTE [DISTWIDTH] IN BLOOD BY AUTOMATED COUNT: 12.4 % (ref 11.5–14.5)
GLUCOSE SERPL-MCNC: 122 MG/DL (ref 74–99)
GLUCOSE UR STRIP.AUTO-MCNC: NORMAL MG/DL
HCT VFR BLD AUTO: 41.8 % (ref 36–46)
HGB BLD-MCNC: 13.7 G/DL (ref 12–16)
IMM GRANULOCYTES # BLD AUTO: 0.05 X10*3/UL (ref 0–0.5)
IMM GRANULOCYTES NFR BLD AUTO: 0.5 % (ref 0–0.9)
KETONES UR STRIP.AUTO-MCNC: ABNORMAL MG/DL
LACTATE SERPL-SCNC: 1 MMOL/L (ref 0.4–2)
LEUKOCYTE ESTERASE UR QL STRIP.AUTO: ABNORMAL
LIPASE SERPL-CCNC: 16 U/L (ref 9–82)
LYMPHOCYTES # BLD AUTO: 0.82 X10*3/UL (ref 0.8–3)
LYMPHOCYTES NFR BLD AUTO: 7.8 %
MCH RBC QN AUTO: 30 PG (ref 26–34)
MCHC RBC AUTO-ENTMCNC: 32.8 G/DL (ref 32–36)
MCV RBC AUTO: 92 FL (ref 80–100)
MONOCYTES # BLD AUTO: 0.33 X10*3/UL (ref 0.05–0.8)
MONOCYTES NFR BLD AUTO: 3.2 %
NEUTROPHILS # BLD AUTO: 9.16 X10*3/UL (ref 1.6–5.5)
NEUTROPHILS NFR BLD AUTO: 87.4 %
NITRITE UR QL STRIP.AUTO: NEGATIVE
NRBC BLD-RTO: 0 /100 WBCS (ref 0–0)
PH UR STRIP.AUTO: 6.5 [PH]
PLATELET # BLD AUTO: 247 X10*3/UL (ref 150–450)
POC APPEARANCE, URINE: CLEAR
POC BILIRUBIN, URINE: NEGATIVE
POC BLOOD, URINE: ABNORMAL
POC COLOR, URINE: ABNORMAL
POC GLUCOSE, URINE: NEGATIVE MG/DL
POC KETONES, URINE: ABNORMAL MG/DL
POC LEUKOCYTES, URINE: NEGATIVE
POC NITRITE,URINE: NEGATIVE
POC PH, URINE: 6 PH
POC PROTEIN, URINE: NEGATIVE MG/DL
POC SPECIFIC GRAVITY, URINE: 1.02
POC UROBILINOGEN, URINE: 0.2 EU/DL
POTASSIUM SERPL-SCNC: 4.2 MMOL/L (ref 3.5–5.3)
PROT SERPL-MCNC: 7.4 G/DL (ref 6.4–8.2)
PROT UR STRIP.AUTO-MCNC: NEGATIVE MG/DL
RBC # BLD AUTO: 4.57 X10*6/UL (ref 4–5.2)
RBC # UR STRIP.AUTO: ABNORMAL MG/DL
RBC #/AREA URNS AUTO: >20 /HPF
SODIUM SERPL-SCNC: 135 MMOL/L (ref 136–145)
SP GR UR STRIP.AUTO: 1.02
UROBILINOGEN UR STRIP.AUTO-MCNC: NORMAL MG/DL
WBC # BLD AUTO: 10.5 X10*3/UL (ref 4.4–11.3)
WBC #/AREA URNS AUTO: ABNORMAL /HPF

## 2025-03-27 PROCEDURE — 83605 ASSAY OF LACTIC ACID: CPT | Performed by: STUDENT IN AN ORGANIZED HEALTH CARE EDUCATION/TRAINING PROGRAM

## 2025-03-27 PROCEDURE — 2500000001 HC RX 250 WO HCPCS SELF ADMINISTERED DRUGS (ALT 637 FOR MEDICARE OP): Performed by: STUDENT IN AN ORGANIZED HEALTH CARE EDUCATION/TRAINING PROGRAM

## 2025-03-27 PROCEDURE — 2550000001 HC RX 255 CONTRASTS: Performed by: STUDENT IN AN ORGANIZED HEALTH CARE EDUCATION/TRAINING PROGRAM

## 2025-03-27 PROCEDURE — 74177 CT ABD & PELVIS W/CONTRAST: CPT | Performed by: RADIOLOGY

## 2025-03-27 PROCEDURE — 81001 URINALYSIS AUTO W/SCOPE: CPT | Performed by: STUDENT IN AN ORGANIZED HEALTH CARE EDUCATION/TRAINING PROGRAM

## 2025-03-27 PROCEDURE — 83690 ASSAY OF LIPASE: CPT | Performed by: STUDENT IN AN ORGANIZED HEALTH CARE EDUCATION/TRAINING PROGRAM

## 2025-03-27 PROCEDURE — 85025 COMPLETE CBC W/AUTO DIFF WBC: CPT | Performed by: STUDENT IN AN ORGANIZED HEALTH CARE EDUCATION/TRAINING PROGRAM

## 2025-03-27 PROCEDURE — 74177 CT ABD & PELVIS W/CONTRAST: CPT

## 2025-03-27 PROCEDURE — 80053 COMPREHEN METABOLIC PANEL: CPT | Performed by: STUDENT IN AN ORGANIZED HEALTH CARE EDUCATION/TRAINING PROGRAM

## 2025-03-27 PROCEDURE — 99285 EMERGENCY DEPT VISIT HI MDM: CPT | Mod: 25 | Performed by: STUDENT IN AN ORGANIZED HEALTH CARE EDUCATION/TRAINING PROGRAM

## 2025-03-27 PROCEDURE — 2500000004 HC RX 250 GENERAL PHARMACY W/ HCPCS (ALT 636 FOR OP/ED): Performed by: STUDENT IN AN ORGANIZED HEALTH CARE EDUCATION/TRAINING PROGRAM

## 2025-03-27 PROCEDURE — 87086 URINE CULTURE/COLONY COUNT: CPT | Mod: TRILAB | Performed by: STUDENT IN AN ORGANIZED HEALTH CARE EDUCATION/TRAINING PROGRAM

## 2025-03-27 PROCEDURE — 36415 COLL VENOUS BLD VENIPUNCTURE: CPT | Performed by: STUDENT IN AN ORGANIZED HEALTH CARE EDUCATION/TRAINING PROGRAM

## 2025-03-27 PROCEDURE — 96361 HYDRATE IV INFUSION ADD-ON: CPT

## 2025-03-27 PROCEDURE — 2500000005 HC RX 250 GENERAL PHARMACY W/O HCPCS: Performed by: STUDENT IN AN ORGANIZED HEALTH CARE EDUCATION/TRAINING PROGRAM

## 2025-03-27 PROCEDURE — 96374 THER/PROPH/DIAG INJ IV PUSH: CPT | Mod: 59

## 2025-03-27 RX ORDER — TAMSULOSIN HYDROCHLORIDE 0.4 MG/1
0.4 CAPSULE ORAL DAILY
Qty: 7 CAPSULE | Refills: 0 | Status: SHIPPED | OUTPATIENT
Start: 2025-03-27 | End: 2025-04-03

## 2025-03-27 RX ORDER — IBUPROFEN 600 MG/1
600 TABLET ORAL EVERY 6 HOURS PRN
Qty: 30 TABLET | Refills: 0 | Status: SHIPPED | OUTPATIENT
Start: 2025-03-27 | End: 2025-04-26

## 2025-03-27 RX ORDER — MORPHINE SULFATE 2 MG/ML
2 INJECTION, SOLUTION INTRAMUSCULAR; INTRAVENOUS ONCE
Status: COMPLETED | OUTPATIENT
Start: 2025-03-27 | End: 2025-03-27

## 2025-03-27 RX ORDER — LIDOCAINE HYDROCHLORIDE 20 MG/ML
15 SOLUTION OROPHARYNGEAL ONCE
Status: COMPLETED | OUTPATIENT
Start: 2025-03-27 | End: 2025-03-27

## 2025-03-27 RX ORDER — ALUMINUM HYDROXIDE, MAGNESIUM HYDROXIDE, AND SIMETHICONE 1200; 120; 1200 MG/30ML; MG/30ML; MG/30ML
30 SUSPENSION ORAL ONCE
Status: COMPLETED | OUTPATIENT
Start: 2025-03-27 | End: 2025-03-27

## 2025-03-27 RX ORDER — FAMOTIDINE 20 MG/1
20 TABLET, FILM COATED ORAL ONCE
Status: COMPLETED | OUTPATIENT
Start: 2025-03-27 | End: 2025-03-27

## 2025-03-27 RX ADMIN — IOHEXOL 75 ML: 350 INJECTION, SOLUTION INTRAVENOUS at 20:40

## 2025-03-27 RX ADMIN — ALUMINUM HYDROXIDE, MAGNESIUM HYDROXIDE, AND DIMETHICONE 30 ML: 200; 20; 200 SUSPENSION ORAL at 20:06

## 2025-03-27 RX ADMIN — FAMOTIDINE 20 MG: 20 TABLET, FILM COATED ORAL at 20:06

## 2025-03-27 RX ADMIN — SODIUM CHLORIDE 1000 ML: 900 INJECTION, SOLUTION INTRAVENOUS at 20:06

## 2025-03-27 RX ADMIN — MORPHINE SULFATE 2 MG: 2 INJECTION, SOLUTION INTRAMUSCULAR; INTRAVENOUS at 20:06

## 2025-03-27 RX ADMIN — LIDOCAINE HYDROCHLORIDE 15 ML: 20 SOLUTION ORAL at 20:06

## 2025-03-27 ASSESSMENT — ENCOUNTER SYMPTOMS
DIARRHEA: 0
FLANK PAIN: 0
VOMITING: 0
FEVER: 0
DYSURIA: 0
CHILLS: 0
CONSTIPATION: 0
FREQUENCY: 0
NAUSEA: 0
ABDOMINAL PAIN: 1

## 2025-03-27 ASSESSMENT — PAIN SCALES - GENERAL
PAINLEVEL_OUTOF10: 9
PAINLEVEL_OUTOF10: 3

## 2025-03-27 ASSESSMENT — PAIN DESCRIPTION - DESCRIPTORS
DESCRIPTORS: CRAMPING
DESCRIPTORS: CRAMPING

## 2025-03-27 ASSESSMENT — PAIN - FUNCTIONAL ASSESSMENT
PAIN_FUNCTIONAL_ASSESSMENT: 0-10
PAIN_FUNCTIONAL_ASSESSMENT: 0-10

## 2025-03-27 ASSESSMENT — PAIN DESCRIPTION - FREQUENCY: FREQUENCY: CONSTANT/CONTINUOUS

## 2025-03-27 ASSESSMENT — PAIN DESCRIPTION - ORIENTATION: ORIENTATION: LEFT;LOWER

## 2025-03-27 ASSESSMENT — PAIN DESCRIPTION - DIRECTION: RADIATING_TOWARDS: LEFT LOWER BACK

## 2025-03-27 ASSESSMENT — PAIN DESCRIPTION - LOCATION: LOCATION: ABDOMEN

## 2025-03-27 ASSESSMENT — COLUMBIA-SUICIDE SEVERITY RATING SCALE - C-SSRS
2. HAVE YOU ACTUALLY HAD ANY THOUGHTS OF KILLING YOURSELF?: NO
6. HAVE YOU EVER DONE ANYTHING, STARTED TO DO ANYTHING, OR PREPARED TO DO ANYTHING TO END YOUR LIFE?: NO
1. IN THE PAST MONTH, HAVE YOU WISHED YOU WERE DEAD OR WISHED YOU COULD GO TO SLEEP AND NOT WAKE UP?: NO

## 2025-03-27 ASSESSMENT — PAIN DESCRIPTION - PAIN TYPE: TYPE: ACUTE PAIN

## 2025-03-27 NOTE — PROGRESS NOTES
Subjective   Patient ID: Elma Celestin is a 74 y.o. female. They present today with a chief complaint of Abdominal Pain (For 2 hours).    History of Present Illness  See mdm            Past Medical History  Allergies as of 03/27/2025 - Reviewed 03/27/2025   Allergen Reaction Noted    Latex, natural rubber Other 07/20/2018    Meloxicam Other 09/05/2014    Adhesive Rash 06/26/2023       (Not in a hospital admission)       Past Medical History:   Diagnosis Date    Abdominal bloating 02/29/2024    Abrasion of left knee 02/29/2024    Acute bronchitis 02/29/2024    Acute upper respiratory infection 02/29/2024    Aftercare following joint replacement surgery 09/13/2019    Orthopedic aftercare for joint replacement    Anemia 02/29/2024    Anesthesia of skin 06/02/2022    Numbness of extremity    Anxiety 06/26/2023    Arthritis     Bursitis of hip 02/29/2024    Cataract     Cough 02/29/2024    Cyst of pancreas ([facility]) 10/24/2018    Pancreatic cyst    Depression     Dizziness 09/20/2023    Discussed changing positions slowly when laying down  Red flags discussed  Happens only when going from laying to sitting    Eczema of both external ears 06/26/2023    Elevated BP without diagnosis of hypertension 06/26/2023    Encounter for screening for malignant neoplasm of respiratory organs 09/25/2019    Encounter for screening for lung cancer    Epigastric pain 09/24/2018    Dyspepsia    Epistaxis 02/29/2024    Excessive thirst 02/29/2024    Fall on same level from stumbling 02/29/2024    Fatigue 02/29/2024    GERD (gastroesophageal reflux disease)     Herpesviral vesicular dermatitis 04/15/2016    Recurrent cold sores    History of thyroid cancer     Hyperglycemia, unspecified 11/28/2022    Stress hyperglycemia    Left shoulder pain 06/26/2023    Local infection of the skin and subcutaneous tissue, unspecified 11/06/2017    Skin infection    Lumbar radiculitis 06/26/2023    Osteoarthritis of hip 06/26/2023    Other bursitis of  hip, right hip 07/13/2020    Bursitis of right hip    Other conditions influencing health status 04/22/2019    Antibiotic prophylaxis for dental procedure indicated due to prior joint replacement    Other intervertebral disc displacement, lumbosacral region 12/29/2021    Displacement of lumbosacral intervertebral disc    Overweight 12/29/2021    Overweight with body mass index (BMI) of 27 to 27.9 in adult    Papillary thyroid carcinoma (Multi) 06/26/2023    Personal history of other benign neoplasm 10/24/2018    History of other benign neoplasm    Personal history of other diseases of the musculoskeletal system and connective tissue 11/24/2020    History of osteoporosis    Personal history of other mental and behavioral disorders 07/26/2018    History of depression    Personal history of other specified conditions 09/24/2018    History of urinary frequency    Personal history of other specified conditions 10/07/2020    History of multiple pulmonary nodules    Personal history of urinary (tract) infections     History of urinary tract infection    Presence of right artificial hip joint 03/18/2016    Status post right hip replacement    Primary osteoarthritis of right hip 06/26/2023    Pseudophakia 06/26/2023    Rash and other nonspecific skin eruption 09/25/2019    Skin rash    Rhinitis 06/26/2023    Right leg numbness 06/26/2023    Spondylosis without myelopathy or radiculopathy, sacral and sacrococcygeal region 05/04/2021    Arthropathy of right sacroiliac joint    Stress hyperglycemia 06/26/2023    Tear of left rotator cuff 09/30/2023    Thyroid cancer (Multi) 06/26/2023    Thyroid nodule 06/26/2023    Toxic effect of venom of bees, accidental (unintentional), initial encounter 07/25/2016    Bee sting reaction, accidental or unintentional, initial encounter    Venous thrombosis 09/30/2023    Yeast infection 02/29/2024    Due to antibiotic use  Stop Monistat  Diflucan 150 mg 1 tablet if no improvement in 3 days may  repeat         Past Surgical History:   Procedure Laterality Date    CATARACT EXTRACTION W/  INTRAOCULAR LENS IMPLANT Right 02/28/2023    Dr. Pittman    CATARACT EXTRACTION W/  INTRAOCULAR LENS IMPLANT Left 04/11/2023    Dr. Pittman    COLONOSCOPY  05/27/2014    Complete Colonoscopy    EYE SURGERY  2023    HYSTERECTOMY  07/12/2013    Hysterectomy    JOINT REPLACEMENT  2014    THYROID SURGERY      TOTAL HIP ARTHROPLASTY  05/27/2014    Total Hip Replacement        reports that she quit smoking about 15 years ago. Her smoking use included cigarettes. She has never used smokeless tobacco. She reports current alcohol use. She reports that she does not use drugs.    Review of Systems  Review of Systems   Constitutional:  Negative for chills and fever.   Gastrointestinal:  Positive for abdominal pain. Negative for constipation, diarrhea, nausea and vomiting.   Genitourinary:  Negative for dysuria, flank pain, frequency and urgency.   All other systems reviewed and are negative.                                 Objective    Vitals:    03/27/25 1806   BP: 143/61   BP Location: Left arm   Patient Position: Sitting   BP Cuff Size: Adult   Pulse: 62   Resp: 16   Temp: 36.2 °C (97.2 °F)   TempSrc: Temporal   SpO2: 98%   Weight: 63.5 kg (140 lb)     No LMP recorded. Patient has had a hysterectomy.    Physical Exam  Vitals and nursing note reviewed.   Constitutional:       General: She is in acute distress.      Appearance: She is ill-appearing.   Abdominal:      General: Abdomen is flat. Bowel sounds are decreased.      Palpations: Abdomen is soft.      Tenderness: There is abdominal tenderness in the left lower quadrant.   Neurological:      Mental Status: She is alert.         Procedures    Point of Care Test & Imaging Results from this visit  Results for orders placed or performed in visit on 03/27/25   POCT UA Automated manually resulted   Result Value Ref Range    POC Color, Urine Light-Yellow Straw, Yellow, Light-Yellow    POC  Appearance, Urine Clear Clear    POC Glucose, Urine NEGATIVE NEGATIVE mg/dl    POC Bilirubin, Urine NEGATIVE NEGATIVE    POC Ketones, Urine 40 (2+) (A) NEGATIVE mg/dl    POC Specific Gravity, Urine 1.025 1.005 - 1.035    POC Blood, Urine TRACE-Lysed (A) NEGATIVE    POC PH, Urine 6.0 No Reference Range Established PH    POC Protein, Urine NEGATIVE NEGATIVE mg/dl    POC Urobilinogen, Urine 0.2 0.2, 1.0 EU/DL    Poc Nitrite, Urine NEGATIVE NEGATIVE    POC Leukocytes, Urine NEGATIVE NEGATIVE      Imaging  No results found.    Cardiology, Vascular, and Other Imaging  No other imaging results found for the past 2 days      Diagnostic study results (if any) were reviewed by Crystal L Severino, APRN-CNP.    Assessment/Plan   Allergies, medications, history, and pertinent labs/EKGs/Imaging reviewed by Crystal L Severino, APRN-CNP.     Medical Decision Making  74-year-old female presents with chief complaint of abdominal pain x 2 hours.  She states she keeps having the feeling as if she needs to have a bowel movement but cannot go.  She states she did have a regular bowel movement this morning and has not had any issues with constipation.  Urine is obtained and is positive for ketones and a small amount of blood.  Patient states she does not have any history of kidney stones.  Based on patient's presenting symptoms and the amount of pain she is exhibiting both with and without palpation I am referring her to the ED for further workup.  Patient states she does not need a ambulance as she is with a family member who will drive her to [facility] at this time.  Patient leaves [facility] in stable condition.    Orders and Diagnoses  Diagnoses and all orders for this visit:  Abdominal pain, unspecified abdominal location  -     POCT UA Automated manually resulted      Medical Admin Record      Patient disposition: ED    Electronically signed by Crystal L Severino, APRN-CNP  7:34 PM

## 2025-03-27 NOTE — ED PROVIDER NOTES
"HPI   Chief Complaint   Patient presents with    Abdominal Pain     Presents to ed from urgent care for a c/c of left lower abd pain. Started 3 hrs ago, states \" its cramping like I have to have a bowel movement\". Last bm this morning, soft. No blood in stool..        Patient presents to ED for acute onset midline to left-sided abdominal pain.  Notes normal BM this morning but has not passed any flatulence since onset of symptoms.  Feels moderately distended.  Notes pain is cramping in nature, constant, moderate in severity, intermittently radiates to left flank.  Notes no history of nephro/urolithiasis.  Is experiencing nausea with bouts of emesis.  Notes a history of hysterectomy but denies any other abdominal surgical history.  Notes history of constipation but did not appreciate any significant stool changes and did not note any hematochezia/melena.  Denies any appreciable fever/chills.  Notes no facial/ocular color changes.  Denies any notable  symptoms.  Denies any other significant systemic symptoms or complaints.  Does note she was recently started on rosuvastatin but denies any other new medications.              Patient History   Past Medical History:   Diagnosis Date    Abdominal bloating 02/29/2024    Abrasion of left knee 02/29/2024    Acute bronchitis 02/29/2024    Acute upper respiratory infection 02/29/2024    Aftercare following joint replacement surgery 09/13/2019    Orthopedic aftercare for joint replacement    Anemia 02/29/2024    Anesthesia of skin 06/02/2022    Numbness of extremity    Anxiety 06/26/2023    Arthritis     Bursitis of hip 02/29/2024    Cataract     Cough 02/29/2024    Cyst of pancreas ([facility]) 10/24/2018    Pancreatic cyst    Depression     Dizziness 09/20/2023    Discussed changing positions slowly when laying down  Red flags discussed  Happens only when going from laying to sitting    Eczema of both external ears 06/26/2023    Elevated BP without diagnosis of hypertension " 06/26/2023    Encounter for screening for malignant neoplasm of respiratory organs 09/25/2019    Encounter for screening for lung cancer    Epigastric pain 09/24/2018    Dyspepsia    Epistaxis 02/29/2024    Excessive thirst 02/29/2024    Fall on same level from stumbling 02/29/2024    Fatigue 02/29/2024    GERD (gastroesophageal reflux disease)     Herpesviral vesicular dermatitis 04/15/2016    Recurrent cold sores    History of thyroid cancer     Hyperglycemia, unspecified 11/28/2022    Stress hyperglycemia    Left shoulder pain 06/26/2023    Local infection of the skin and subcutaneous tissue, unspecified 11/06/2017    Skin infection    Lumbar radiculitis 06/26/2023    Osteoarthritis of hip 06/26/2023    Other bursitis of hip, right hip 07/13/2020    Bursitis of right hip    Other conditions influencing health status 04/22/2019    Antibiotic prophylaxis for dental procedure indicated due to prior joint replacement    Other intervertebral disc displacement, lumbosacral region 12/29/2021    Displacement of lumbosacral intervertebral disc    Overweight 12/29/2021    Overweight with body mass index (BMI) of 27 to 27.9 in adult    Papillary thyroid carcinoma (Multi) 06/26/2023    Personal history of other benign neoplasm 10/24/2018    History of other benign neoplasm    Personal history of other diseases of the musculoskeletal system and connective tissue 11/24/2020    History of osteoporosis    Personal history of other mental and behavioral disorders 07/26/2018    History of depression    Personal history of other specified conditions 09/24/2018    History of urinary frequency    Personal history of other specified conditions 10/07/2020    History of multiple pulmonary nodules    Personal history of urinary (tract) infections     History of urinary tract infection    Presence of right artificial hip joint 03/18/2016    Status post right hip replacement    Primary osteoarthritis of right hip 06/26/2023    Pseudophakia  06/26/2023    Rash and other nonspecific skin eruption 09/25/2019    Skin rash    Rhinitis 06/26/2023    Right leg numbness 06/26/2023    Spondylosis without myelopathy or radiculopathy, sacral and sacrococcygeal region 05/04/2021    Arthropathy of right sacroiliac joint    Stress hyperglycemia 06/26/2023    Tear of left rotator cuff 09/30/2023    Thyroid cancer (Multi) 06/26/2023    Thyroid nodule 06/26/2023    Toxic effect of venom of bees, accidental (unintentional), initial encounter 07/25/2016    Bee sting reaction, accidental or unintentional, initial encounter    Venous thrombosis 09/30/2023    Yeast infection 02/29/2024    Due to antibiotic use  Stop Monistat  Diflucan 150 mg 1 tablet if no improvement in 3 days may repeat       Past Surgical History:   Procedure Laterality Date    CATARACT EXTRACTION W/  INTRAOCULAR LENS IMPLANT Right 02/28/2023    Dr. Pittman    CATARACT EXTRACTION W/  INTRAOCULAR LENS IMPLANT Left 04/11/2023    Dr. Pittman    COLONOSCOPY  05/27/2014    Complete Colonoscopy    EYE SURGERY  2023    HYSTERECTOMY  07/12/2013    Hysterectomy    JOINT REPLACEMENT  2014    THYROID SURGERY      TOTAL HIP ARTHROPLASTY  05/27/2014    Total Hip Replacement     Family History   Problem Relation Name Age of Onset    Anesthesia related problems Mother Shelley     Heart disease Mother Shelley     Leukemia Mother Shelley     Uterine cancer Mother Shelley     Diabetes Father      Cancer Mother's Brother Masayuki     Heart disease Mother's Brother Katsumi     Arthritis Maternal Grandmother Fusako     Stomach cancer Other Uncle     Macular degeneration Neg Hx      Glaucoma Neg Hx       Social History     Tobacco Use    Smoking status: Former     Current packs/day: 0.00     Types: Cigarettes     Quit date: 5/30/2009     Years since quitting: 15.8    Smokeless tobacco: Never   Vaping Use    Vaping status: Never Used   Substance Use Topics    Alcohol use: Yes     Comment: social    Drug use: Never       Physical Exam   BP  "155/72 (BP Location: Left arm, Patient Position: Sitting)   Pulse 68   Temp 36.5 °C (97.7 °F) (Tympanic)   Resp 16   Ht 1.6 m (5' 3\")   Wt 63.5 kg (140 lb)   SpO2 100%   BMI 24.80 kg/m²       Physical Exam  Vitals and nursing note reviewed.   Constitutional:       General: She is not in acute distress.     Appearance: Normal appearance. She is not ill-appearing.   HENT:      Mouth/Throat:      Mouth: Mucous membranes are moist.      Pharynx: Oropharynx is clear.   Eyes:      General: No scleral icterus.     Conjunctiva/sclera: Conjunctivae normal.   Cardiovascular:      Rate and Rhythm: Normal rate.      Pulses:           Posterior tibial pulses are 2+ on the right side and 2+ on the left side.      Comments: Equal and symmetrical distal pulses  Pulmonary:      Effort: Pulmonary effort is normal.   Abdominal:      General: Abdomen is protuberant. There is no distension.      Palpations: Abdomen is soft. There is no mass.      Tenderness: There is abdominal tenderness in the periumbilical area, suprapubic area, left upper quadrant and left lower quadrant. There is left CVA tenderness. There is no right CVA tenderness, guarding or rebound.      Comments: Mild to moderate tenderness palpation over the left side of the abdomen, mildly increased focality of LLQ, no appreciable abdominal distention/increased tympany, no appreciable abdominal wall rigidity or voluntary guarding, not endorsing associated rebound tenderness, no LLQ or suprapubic fullness/mass, no appreciable pain out of proportion to/with exam, mild to moderate L CVAT with percussion   Skin:     General: Skin is warm and dry.      Coloration: Skin is not jaundiced or pale.   Neurological:      General: No focal deficit present.      Mental Status: She is alert and oriented to person, place, and time.           ED Course & MDM   ED Course as of 03/28/25 1541   u Mar 27, 2025   1952 VSS on presentation in setting of acute onset left-sided abdominal " pain [BC]   2034 Comprehensive Metabolic Panel(!)  Mild to moderate FIOR otherwise unremarkable and noncontributory to Patient condition/symptoms [BC]   2034 CBC with Differential(!)  WNL [BC]   2034 Urinalysis with Reflex Culture and Microscopic(!)  Mild to moderate ketonuria associated with decreased p.o. nutritional intake associated with symptoms, negative nitrates with minimal leuk esterase and no significant pyuria but significant hematuria and 1+ bacteria without urinary symptoms, no me concern for UTI/Station will valuate for nephro/urolithiasis [BC]   2035 Lipase  WNL, no mucus or acute pancreatitis [BC]   2035 Lactate  WNL, no new concern for mesenteric ischemia [BC]   2300 CT abdomen pelvis w IV contrast  IMPRESSION:  1.  Mild left hydroureteronephrosis. The distal left ureter is  obscured by beam hardening artifact from the bilateral hip  prostheses, a distal ureteral calculus cannot be excluded on this  exam. Please correlate with laboratory values/urinalysis to exclude  superimposed acute pyelonephritis.  2. Diffuse colonic wall thickening, may be secondary to  underdistention versus colitis.  3. Redemonstration of 1.3 cm cystic lesion at the pancreatic body,  better evaluated on prior MRI/MRCP.  4. Small hiatal hernia.    I have personally reviewed and interpreted the images, mild left-sided perinephric stranding with left-sided hydroureteronephrosis concerning for ureterolithiasis, agree with radiology final read [BC]   2330 On reassessment patient endorses moderate proven symptoms post ED interventions, not Dors any new or significant worsening symptoms.  Tolerating p.o. without symptomatic episodes of nausea or emesis, mild improvement in VS. [BC]      ED Course User Index  [BC] Tigre Martin MD         Diagnoses as of 03/28/25 1541   Acute abdominal pain   Acute flank pain   Ureterolithiasis   Hydroureteronephrosis   FIOR (acute kidney injury)                 No data recorded     [place] Coma  Scale Score: 15 (03/27/25 1017 : Amber Ferguson RN)                           Medical Decision Making  Patient presented to the ED for abdominal pain for the last few hours primarily on left side with associated nausea but no emesis and decreased flatulence with concerning PMHx of surgical history of hysterectomy, GERD, HLD,.  I personally reviewed and interpreted VS, labs, and images which are as stated above in the ED course.    Assessment/evaluation consistent with left-sided ureterolithiasis complicated by hydroureteronephrosis without evidence of acute UTI/cystitis/pyonephritis, mild FIOR.  No concerning history, clinical evidence/work-up, or exam findings for the considered differentials of diverticulitis, significant electrolyte/metabolic derangement, SBO/sigmoid volvulus, acute mesenteric ischemia.  These conditions have been thoroughly evaluated and determined to be sufficiently unlikely to be the etiology of patient's presenting symptoms.    Prescribed ibuprofen and tamsulosin for appropriate symptomatic management and treatment.  After receiving an appropriate exam, clinical work-up, and necessary interventions/treatment, Patient is appropriate for discharge at this time due to no concerning symptoms or findings requiring hospitalization for stabilization or further interrogation/management and is appropriate for management of symptoms at home with recommended appropriate outpatient follow-up.  Patient was encouraged to ask any questions or for clarification of today's ED encounter.  Patient is agreeable to plan of care. Discussed with Patient today's results/findings and likely diagnosis, provided appropriate RTED precautions along with recommended follow-up with PCP and Urologist/Urology .      Per Chart Review: PCP office visit on 3/3/2025 for routine general medical examination and continued management of chronic comorbidities, reestablishing care, review of chronic comorbidities, not present  endorsing any significant symptoms, VSS, exam unremarkable/noncontributory, plan to continue steps medical management of chronic remedies along with updated labs.      Parts of this chart have been completed using voice-to-text recognition software. Please excuse any errors of transcription that were missed for editing/correcting.    Amount and/or Complexity of Data Reviewed  External Data Reviewed: notes.     Details: See MDM  Labs: ordered. Decision-making details documented in ED Course.  Radiology: ordered and independent interpretation performed. Decision-making details documented in ED Course.        Procedure  Procedures     Tigre Martin MD  03/28/25 4623

## 2025-03-28 ENCOUNTER — TELEPHONE (OUTPATIENT)
Dept: RADIOLOGY | Facility: HOSPITAL | Age: 75
End: 2025-03-28
Payer: MEDICARE

## 2025-03-28 NOTE — DISCHARGE INSTRUCTIONS
Thank you for allowing myself and the team to take care of you during your emergency situation and addressing your health concerns.    You were evaluated for abdominal pain.    Your likely condition/diagnosis: Left-sided kidney stone    During your visit in the emergency department you were evaluated for your presenting symptoms.  Based on the extensive workup you received, all efforts were made to identify the source of your symptoms and identify any life-threatening conditions.  These life-threatening conditions that were attempted to be identified and medically managed/treated include but not limited to bleeding/non-bleeding ulcers/wounds of your stomach or intestines, hole in your stomach or intestines, infection/inflammation of your liver, infection/inflammation of your gallbladder and associated biliary (bile) system, appendicitis (infection/inflammation of your appendix), infection/inflammation of your pancreas, bowel/intestine obstruction, small or large intestine inflammation/infection, infection/abscess inside your abdomen, hematoma/bleeding (hemorrhage) inside your abdomen, pregnancy or ectopic pregnancy, ovarian torsion, infection/inflammation of your uterus/fallopian tubes.  Additionally, you may be experiencing symptoms that are non-life-threatening but are uncomfortable and disruptive to your everyday life.  All efforts were made to manage your symptoms while in the emergency department along with appropriate at home therapy for symptomatic management during your recovery. Please be aware that not all of the conditions explained/discussed in these discharge instructions are applicable to your condition but encompass many of the conditions that were evaluated for during your ED encounter.      During your evaluation and assessment, all measures were taken to evaluate you and address your health concerns to identify dangerous and life threatening health conditions. It is not possible to identify all  health conditions or pathologies and eliminate any chance of unfavorable outcomes while in the Emergency Department. My team encourages you to be vigilant with your health and follow-up with the appropriate providers outlined in your discharge paperwork. Please return to the Emergency Department if you feel that your health has not improved or experiencing worsening symptoms.    Special instructions please take the medication prescribed.  Please make a follow-up with your primary care physician to further manage symptoms address your concerns.  Urology referral was placed to further assess and manage her symptoms.    Incidental findings:  None

## 2025-03-28 NOTE — TELEPHONE ENCOUNTER
"Elma is listed on the incidental findings list based on imaging completed 3/27/2025 while in the ER. Radiology noted a known pancreas cyst. Elma had an MRCP 11/1/2024 and has met with Emily Chan PA-C. Per Emily Chan PA-C note on 11/20/2024, \"recommend continued surveillance with MRI/MRCP in 1 year. I will notify her when she is due.\" Do for repeat imaging ~11/1/2025.   "

## 2025-03-29 LAB — BACTERIA UR CULT: NORMAL

## 2025-04-02 DIAGNOSIS — G47.33 OBSTRUCTIVE SLEEP APNEA SYNDROME: Primary | ICD-10-CM

## 2025-04-18 ENCOUNTER — APPOINTMENT (OUTPATIENT)
Dept: INFUSION THERAPY | Facility: CLINIC | Age: 75
End: 2025-04-18
Payer: MEDICARE

## 2025-04-24 ENCOUNTER — APPOINTMENT (OUTPATIENT)
Dept: INFUSION THERAPY | Facility: CLINIC | Age: 75
End: 2025-04-24
Payer: MEDICARE

## 2025-04-24 VITALS
HEART RATE: 74 BPM | SYSTOLIC BLOOD PRESSURE: 137 MMHG | BODY MASS INDEX: 24.78 KG/M2 | WEIGHT: 139.9 LBS | RESPIRATION RATE: 16 BRPM | TEMPERATURE: 98.1 F | DIASTOLIC BLOOD PRESSURE: 87 MMHG | OXYGEN SATURATION: 98 %

## 2025-04-24 DIAGNOSIS — M81.0 AGE RELATED OSTEOPOROSIS, UNSPECIFIED PATHOLOGICAL FRACTURE PRESENCE: ICD-10-CM

## 2025-04-24 PROCEDURE — 96372 THER/PROPH/DIAG INJ SC/IM: CPT | Performed by: NURSE PRACTITIONER

## 2025-04-24 RX ORDER — EPINEPHRINE 0.3 MG/.3ML
0.3 INJECTION SUBCUTANEOUS EVERY 5 MIN PRN
OUTPATIENT
Start: 2025-09-27

## 2025-04-24 RX ORDER — ALBUTEROL SULFATE 0.83 MG/ML
3 SOLUTION RESPIRATORY (INHALATION) AS NEEDED
OUTPATIENT
Start: 2025-09-27

## 2025-04-24 RX ORDER — FAMOTIDINE 10 MG/ML
20 INJECTION, SOLUTION INTRAVENOUS ONCE AS NEEDED
OUTPATIENT
Start: 2025-09-27

## 2025-04-24 RX ORDER — DIPHENHYDRAMINE HYDROCHLORIDE 50 MG/ML
50 INJECTION, SOLUTION INTRAMUSCULAR; INTRAVENOUS AS NEEDED
OUTPATIENT
Start: 2025-09-27

## 2025-04-24 ASSESSMENT — ENCOUNTER SYMPTOMS
WHEEZING: 0
LIGHT-HEADEDNESS: 0
EXTREMITY WEAKNESS: 1
SHORTNESS OF BREATH: 1
LEG SWELLING: 0
NUMBNESS: 1
PALPITATIONS: 0
COUGH: 1
DIZZINESS: 0
WOUND: 0

## 2025-04-24 NOTE — PATIENT INSTRUCTIONS
Today :We administered denosumab.  6 month Prolia 60mg subcutaneous injection in the left upper arm.  Blood calcium to be drawn within 28 days of next Prolia appointment.      For:   1. Age related osteoporosis, unspecified pathological fracture presence       Your next appointment is due in:  6 months        Please read the  Medication Guide that was given to you and reviewed during todays visit.     (Tell all doctors including dentists that you are taking this medication)     Go to the emergency room or call 911 if:  -You have signs of allergic reaction:   -Rash, hives, itching.   -Swollen, blistered, peeling skin.   -Swelling of face, lips, mouth, tongue or throat.   -Tightness of chest, trouble breathing, swallowing or talking     Call your doctor:  - If injection site gets red, warm, swollen, itchy or leaks fluid or pus.     (Leave band aid on your injection site for at least 2 hours and keep area clean and dry  - If you get sick or have symptoms of infection or are not feeling well for any reason.    (Wash your hands often, stay away from people who are sick)  - If you have side effects from your medication that do not go away or are bothersome.     (Refer to the teaching your nurse gave you for side effects to call your doctor about)    - Common side effects may include:  stuffy nose, headache, feeling tired, muscle aches, upset stomach  - Before receiving any vaccines     - Call the [facility] at   If:  - You get sick, are on antibiotics, have had a recent vaccine, have surgery or dental work and your doctor wants your visit rescheduled.  - You need to cancel and reschedule your visit for any reason. Call at least 2 days before your visit if you need to cancel.   - Your insurance changes before your next visit.    (We will need to get approval from your new insurance. This can take up to two weeks.)     The [facility] is opened Monday thru Friday. We are closed on weekends  and holidays.   Voice mail will take your call if the center is closed. If you leave a message please allow 24 hours for a call back during weekdays. If you leave a message on a weekend/holiday, we will call you back the next business day.    A pharmacist is available Monday - Friday from 8:30AM to 3:30PM to help answer any questions you may have about your prescriptions(s). Please call pharmacy at:    [facility]: [phone]  [facility]: [phone]  [facility]: [phone]

## 2025-04-24 NOTE — PROGRESS NOTES
[facility]   Infusion Clinic Note   Date: 2025   Name: Elma Celestin  : 1950   MRN: [id number]         Reason for Visit: Follow-up and Injections (6 month Prolia 60mg subcutaneous/injection)         Today: We administered denosumab.       Ordered By: Jing Arriaga MD       For a Diagnosis of: Age related osteoporosis, unspecified pathological fracture presence       At today's visit patient accompanied by: Self      Today's Vitals:   Vitals:    25 1459   BP: 137/87   Pulse: 74   Resp: 16   Temp: 36.7 °C (98.1 °F)   TempSrc: Temporal   SpO2: 98%   Weight: 63.5 kg (139 lb 14.4 oz)             Pre - Treatment Checklist:      - Previous reaction to current treatment:  Yes.   Patient states she is achy a few days later.      (Assess patient for the concerns below. Document provider notification as appropriate).  - Active or recent infection with/without current antibiotic use: no  - Recent or planned invasive dental work: no  - Recent or planned surgeries: no  - Recently received or plans to receive vaccinations: no  - Has treatment related toxicities: no  - Any chance may be pregnant:  no      Pain: 5/10 right knee pain upon exertion   - Is the pain different from normal: n/a   - Is prescribing Doctor aware:  n/a      Labs: Reviewed       Fall Risk Screening: Mark Fall Risk  History of Falling, Immediate or Within 3 Months: No  Secondary Diagnosis: No  Ambulatory Aid: Walks without aid/bedrest/nurse assist  Intravenous Therapy/Heparin Lock: No  Gait/Transferring: Normal/bedrest/immobile  Mental Status: Oriented to own ability  Flores Fall Risk Score: 0       Review Of Systems:  Review of Systems   Respiratory:  Positive for cough and shortness of breath. Negative for wheezing.         Patient has a cough and sob lately.   Patient has sinsus   Cardiovascular:  Negative for chest pain, leg swelling and palpitations.   Skin:  Negative for itching, rash and wound.    Neurological:  Positive for extremity weakness and numbness. Negative for dizziness and light-headedness.        Patient states she has numbness in bi - lateral feet    Patient has extremity weakness in right leg due to a torn meniscus in her knee.         Infusion Readiness:  - Assessment Concerns Related to Infusion: No  - Provider notified: no      New Patient Education:    N/A (returning patient for continuation of therapy. Ongoing education provided as needed.)        Treatment Conditions & Drug Specific Questions:    Denosumab  (PROLIA. XGEVA. STOBOCLO)    (Unless otherwise specified on patient specific therapy plan):     TREATMENT CONDITIONS:  Unless otherwise specified on patient specific therapy plan HOLD and notify provider prior to proceeding with today's injection if patients:  O Corrected or Serum Calcium LESS THAN 8.6 mg/dL  OR Ionized calcium less than 1.1 mmol/L or  less than 4.7 mg/dL (depending on resulting agency)  o Recent or planned invasive dental procedure (within 4 weeks)    Lab Results   Component Value Date    CALCIUM 9.1 03/27/2025      Lab Results   Component Value Date    CAION 1.13 04/02/2024     Patient meets treatment conditions? Yes    DRUG SPECIFIC QUESTIONS:  Is the patient taking calcium and vitamin D? Yes  (Recommended)    Pt Instructed on following risks: (1) hypocalcemia, (2) osteonecrosis of the jaw, (3) atypical femoral fractures, (4) serious infections, and (5) dermatologic reactions?  Yes      REMINDER:  PREGNANCY CATEGORY X DRUG. OBTAIN NEGTATIVE PREGNANCY TEST PRIOR TO FIRST INFUSION FOR WOMEN OF CHILDBEARING ABILITY   REMS DRUG    Recommended Vitals/Observation:  Vitals: Obtain vitals prior to injection.  Observation: Patient may leave immediately following injection.        Weight Based Drug Calculations:    WEIGHT BASED DRUGS: NOT APPLICABLE / FLAT DOSE       Post Treatment: Patient tolerated treatment without issue and was discharged in no apparent distress.       Note Authored / Patient Cared for By: Myra Monroe LPN

## 2025-05-04 ASSESSMENT — SLIT LAMP EXAM - LIDS
COMMENTS: GOOD POSITION
COMMENTS: GOOD POSITION

## 2025-05-04 ASSESSMENT — CUP TO DISC RATIO: OS_RATIO: 0.3

## 2025-05-04 ASSESSMENT — EXTERNAL EXAM - LEFT EYE: OS_EXAM: NORMAL

## 2025-05-04 ASSESSMENT — EXTERNAL EXAM - RIGHT EYE: OD_EXAM: NORMAL

## 2025-05-04 NOTE — PROGRESS NOTES
History of YAG laser capsulotomy, left eye  Posterior capsular opacification, left eye  Pseudophakia  -S/p phacoemulsification/IOL OD 2/28/23 OS 4/11/23 ([facility])  -S/p YAG laser capsulotomy OS 3/18/25 - vision improved.   -F/u 1 year for comprehensive exam and OCT macula.     RPE mottling, left eye  -OCT macula (2/25/25) - Normal thickness and contour OU. Outer retinal disruption nasal to fovea OS. No edema OU. 256/245. Stable from 2/27/24.   -Unclear etiology, no history of eye trauma, sun gazing, laser pointer injury.   -Monitor for now. Plan to repeat comprehensive exam with OCT macula in 1 year.    Prediabetes  -HbA1c= 5.9 (3/8/25). No diabetic retinopathy seen on exam. Continue close monitoring of blood glucose, blood pressure, and cholesterol. Plan for annual dilated eye exam.    Presbyopia  -Continue current glasses, may also use OTC reading glasses.  -Rx confirmation signature obtained 2/25/25  -New Rx given per patient request.       No FH of AMD/glaucoma

## 2025-05-06 ENCOUNTER — APPOINTMENT (OUTPATIENT)
Dept: OPHTHALMOLOGY | Facility: CLINIC | Age: 75
End: 2025-05-06
Payer: MEDICARE

## 2025-05-06 DIAGNOSIS — H35.89 RPE MOTTLING OF MACULA: ICD-10-CM

## 2025-05-06 DIAGNOSIS — R73.03 PRE-DIABETES: ICD-10-CM

## 2025-05-06 DIAGNOSIS — H52.4 PRESBYOPIA: ICD-10-CM

## 2025-05-06 DIAGNOSIS — Z98.42 HISTORY OF YAG LASER CAPSULOTOMY OF LENS OF LEFT EYE: Primary | ICD-10-CM

## 2025-05-06 DIAGNOSIS — Z96.1 PSEUDOPHAKIA: ICD-10-CM

## 2025-05-06 DIAGNOSIS — H26.492 PCO (POSTERIOR CAPSULAR OPACIFICATION), LEFT: ICD-10-CM

## 2025-05-06 PROCEDURE — 99024 POSTOP FOLLOW-UP VISIT: CPT | Performed by: OPHTHALMOLOGY

## 2025-05-06 ASSESSMENT — CONF VISUAL FIELD
OS_NORMAL: 1
OS_SUPERIOR_TEMPORAL_RESTRICTION: 0
OS_INFERIOR_TEMPORAL_RESTRICTION: 0
OD_INFERIOR_NASAL_RESTRICTION: 0
OS_SUPERIOR_NASAL_RESTRICTION: 0
OD_SUPERIOR_TEMPORAL_RESTRICTION: 0
OD_NORMAL: 1
OD_SUPERIOR_NASAL_RESTRICTION: 0
OD_INFERIOR_TEMPORAL_RESTRICTION: 0
OS_INFERIOR_NASAL_RESTRICTION: 0

## 2025-05-06 ASSESSMENT — VISUAL ACUITY
OS_CC+: -2
OD_CC+: -2
CORRECTION_TYPE: GLASSES
OS_CC: 20/20
OD_CC: 20/20
METHOD: SNELLEN - LINEAR

## 2025-05-06 ASSESSMENT — REFRACTION_MANIFEST
OS_AXIS: 077
OS_CYLINDER: -0.75
OD_CYLINDER: -0.75
METHOD_AUTOREFRACTION: 1
OD_SPHERE: +1.00
OS_CYLINDER: -0.75
OD_ADD: +2.50
OD_AXIS: 108
OD_AXIS: 108
OD_SPHERE: +0.75
OS_ADD: +2.50
OS_SPHERE: +0.50
OS_SPHERE: +0.75
OD_CYLINDER: -0.50
OS_AXIS: 077

## 2025-05-06 ASSESSMENT — TONOMETRY
OS_IOP_MMHG: 11
OD_IOP_MMHG: 12
IOP_METHOD: GOLDMANN APPLANATION

## 2025-05-06 ASSESSMENT — REFRACTION_WEARINGRX
OD_CYLINDER: -0.25
OD_ADD: +2.50
OS_SPHERE: PLANO
OD_SPHERE: +0.25
OS_CYLINDER: SPHERE
OS_ADD: +2.50
OD_AXIS: 120

## 2025-05-06 ASSESSMENT — ENCOUNTER SYMPTOMS: EYES NEGATIVE: 1

## 2025-05-14 ENCOUNTER — APPOINTMENT (OUTPATIENT)
Dept: SLEEP MEDICINE | Facility: CLINIC | Age: 75
End: 2025-05-14
Payer: MEDICARE

## 2025-05-14 VITALS
BODY MASS INDEX: 23.92 KG/M2 | HEIGHT: 63 IN | OXYGEN SATURATION: 96 % | WEIGHT: 135 LBS | HEART RATE: 87 BPM | SYSTOLIC BLOOD PRESSURE: 124 MMHG | DIASTOLIC BLOOD PRESSURE: 72 MMHG

## 2025-05-14 DIAGNOSIS — G47.33 OBSTRUCTIVE SLEEP APNEA (ADULT) (PEDIATRIC): Primary | ICD-10-CM

## 2025-05-14 PROCEDURE — 1159F MED LIST DOCD IN RCRD: CPT | Performed by: INTERNAL MEDICINE

## 2025-05-14 PROCEDURE — 1036F TOBACCO NON-USER: CPT | Performed by: INTERNAL MEDICINE

## 2025-05-14 PROCEDURE — 1160F RVW MEDS BY RX/DR IN RCRD: CPT | Performed by: INTERNAL MEDICINE

## 2025-05-14 PROCEDURE — 99204 OFFICE O/P NEW MOD 45 MIN: CPT | Performed by: INTERNAL MEDICINE

## 2025-05-14 PROCEDURE — 1126F AMNT PAIN NOTED NONE PRSNT: CPT | Performed by: INTERNAL MEDICINE

## 2025-05-14 PROCEDURE — G2211 COMPLEX E/M VISIT ADD ON: HCPCS | Performed by: INTERNAL MEDICINE

## 2025-05-14 ASSESSMENT — SLEEP AND FATIGUE QUESTIONNAIRES
HOW LIKELY ARE YOU TO NOD OFF OR FALL ASLEEP WHEN YOU ARE A PASSENGER IN A CAR FOR AN HOUR WITHOUT A BREAK: WOULD NEVER DOZE
HOW LIKELY ARE YOU TO NOD OFF OR FALL ASLEEP WHILE WATCHING TV: WOULD NEVER DOZE
HOW LIKELY ARE YOU TO NOD OFF OR FALL ASLEEP WHILE SITTING QUIETLY AFTER LUNCH WITHOUT ALCOHOL: WOULD NEVER DOZE
DIFFICULTY_FALLING_ASLEEP: SEVERE
DIFFICULTY_STAYING_ASLEEP: MODERATE
SLEEP_PROBLEM_INTERFERES_DAILY_ACTIVITIES: NOT AT ALL NOTICEABLE
SATISFACTION_WITH_CURRENT_SLEEP_PATTERN: SATISFIED
HOW LIKELY ARE YOU TO NOD OFF OR FALL ASLEEP WHILE SITTING AND READING: WOULD NEVER DOZE
SITING INACTIVE IN A PUBLIC PLACE LIKE A CLASS ROOM OR A MOVIE THEATER: WOULD NEVER DOZE
HOW LIKELY ARE YOU TO NOD OFF OR FALL ASLEEP WHILE SITTING AND TALKING TO SOMEONE: WOULD NEVER DOZE
ESS-CHAD TOTAL SCORE: 0
SLEEP_PROBLEM_NOTICEABLE_TO_OTHERS: NOT AT ALL NOTICEABLE
WORRIED_DISTRESSED_DUE_TO_SLEEP: A LITTLE
HOW LIKELY ARE YOU TO NOD OFF OR FALL ASLEEP WHILE LYING DOWN TO REST IN THE AFTERNOON WHEN CIRCUMSTANCES PERMIT: WOULD NEVER DOZE
HOW LIKELY ARE YOU TO NOD OFF OR FALL ASLEEP IN A CAR, WHILE STOPPED FOR A FEW MINUTES IN TRAFFIC: WOULD NEVER DOZE

## 2025-05-14 ASSESSMENT — PAIN SCALES - GENERAL: PAINLEVEL_OUTOF10: 0-NO PAIN

## 2025-05-14 NOTE — PROGRESS NOTES
Subjective   Patient ID: Elma Celestin is a 74 y.o. female who presents for Difficulties Falling Asleep and Difficulties Staying Asleep.  HPI  History of Present Illness  74-year-old female presents for evaluation of sleep apnea.    Sleep Apnea  Referred for home sleep test by PCP Dr. Singh after CT scan revealed lung nodules. Reports sinus drainage and coughing, possibly affecting sleep test accuracy. Sleep pattern: bed at midnight, falls asleep with TV/book, wakes at 8:00 AM to use bathroom. No daytime napping, sleeps on side, occasionally on back. Reports night sweats, diagnosed with GERD, experiences foot numbness, no restless legs syndrome. No sleepwalking or acting out dreams. Difficulty with focus and memory, attributed to stress from 's health issues. Quit smoking in 2010, consumes alcohol infrequently, limited caffeine intake to 1-2 cups of coffee daily, avoids caffeine after 4:00 PM. No marijuana or other drugs. Snores when extremely fatigued, uncertain if regular.  - Onset: Referred for home sleep test after CT scan revealed lung nodules.  - Location: Sinus drainage and coughing possibly affecting sleep test accuracy.  - Duration: Sleep pattern includes going to bed at midnight and waking at 8:00 AM.  - Character: Night sweats, GERD, foot numbness, no restless legs syndrome, no sleepwalking or acting out dreams.  - Alleviating/Aggravating Factors: Limited caffeine intake to 1-2 cups of coffee daily, avoids caffeine after 4:00 PM.  - Timing: Sleeps on side, occasionally on back; snores when extremely fatigued.  - Severity: Difficulty with focus and memory attributed to stress from 's health issues.    History of Thyroid Cancer and Deviated Septum  History of partial thyroidectomy for thyroid cancer, diagnosed with deviated septum by ENT.    SOCIAL HISTORY  The patient quit smoking in 2010. They consume alcohol occasionally, typically when socializing. The patient drinks 1-2 cups of coffee  "daily and occasionally drinks Pepsi. They do not use marijuana or other drugs.       ESS: 0   Insomnia Severity Index  1. Difficulty falling asleep: Severe  2. Difficulty staying asleep: Moderate  3. Problems waking up too early: None  4. How SATISFIED/DISSATISFIED are you with your CURRENT sleep pattern?: Satisfied  5. How NOTICEABLE to others do you think your sleep problem is in terms of impairing the quality of your life?: Not at all Noticeable  6. How WORRIED/DISTRESSED are you about your current sleep problem?: A Little  7. To what extent do you consider your sleep problem to INTERFERE with your daily functioning (e.g. daytime fatigue, mood, ability to function at work/daily chores, concentration, memory, mood, etc.) CURRENTLY?: Not at all Noticeable  TOSHIA TS: 0-7 = No clinically significant insomnia 8-14 = Subthreshold insomnia 15-21 = Clinical insomnia (moderate severity) 22-28 = Clinical insomnia (severe): 8   FOSQ: 32    Review of Systems  Review of systems negative except as per HPI  Objective     /72   Pulse 87   Ht 1.6 m (5' 3\")   Wt 61.2 kg (135 lb)   SpO2 96%   BMI 23.91 kg/m²      Physical Exam  General Appearance: Alert, pleasant, cooperative, no acute distress.  Vital signs: Within normal limits.  HEENT: Oral exam: modified Dickerson score 1, midline uvula, tonsils 1+, no tongue scalloping, Mallampati 3-4, narrow high arch palate. Nasal exam: deviated septum, right middle turbinate edema hypertrophy.       Results  - Imaging:    - CT scan of lungs showed nodules    - Testing:    - Home sleep apnea test on March 18, 2025:      - Severe obstructive sleep apnea      - 32 episodes/hour      - Oxygen levels dropped to 78%       Assessment/Plan   Diagnoses and all orders for this visit:  Obstructive sleep apnea (adult) (pediatric)  -     Positive Airway Pressure (PAP) Therapy  -     Follow Up In Adult Sleep Medicine; Future    Assessment & Plan  1. Severe obstructive sleep apnea: Chronic. Home " sleep apnea test on 03/18/2025 showed significant obstructive apnea with oxygen saturation dropping to 78% and apnea occurring approximately 32 times per hour. Symptoms including nocturnal awakenings, frequent urination, heartburn, and night sweats may be exacerbated by sleep apnea. Deviated septum and high narrow arch palate could be contributing factors.  - CPAP therapy ordered from [facility] [place], instructed to use nightly for at least 4 hours.  - Discussed benefits of CPAP therapy: improved sleep quality and daytime functioning.  - Considered alternatives: dental appliances, Inspire surgery.  - Advised to persist with therapy for a few months for adjustment.    Follow-up  - Follow up in 3 months.    PROCEDURE  The patient has a history of partial thyroidectomy due to thyroid cancer.       Patient Instructions   Thank you for coming to the Sleep Medicine Clinic today! Your sleep medicine provider today was: Keaton Soto MD Below is a summary of your treatment plan, patient education, other important information, and our contact numbers.      TREATMENT PLAN     Follow-up Appointment:   Follow-up in 31-90 days after getting new machine.    PATIENT EDUCATION     You can also go to the following EDUCATION WEBSITES for further information:   American Academy of Sleep Medicine http://sleepeducation.org  National Sleep Foundation: https://sleepfoundation.org  American Sleep Apnea Association: https://www.sleepapnea.org (for patients with sleep apnea)  Narcolepsy Network: https://www.narcolepsynetwork.org (for patients with narcolepsy)  [employer]: https://www.wakeupnarcolepsy.org (for patients with narcolepsy)  Hypersomnia Foundation: https://www.hypersomniafoundation.org (for patients with idiopathic hypersomnia)  RLS foundation: https://www.rls.org (for patients with restless leg syndrome)    IMPORTANT INFORMATION     Call 911 for medical emergencies.  Our offices are generally open from Monday-Friday, 8 am - 5  pm.   There are no supporting services by either the sleep doctors or their staff on weekends and Holidays, or after 5 PM on weekdays.   If you need to get in touch with me, you may either call my office number or you can use [employer].  If a referral for a test, for CPAP, or for another specialist was made, and you have not heard about scheduling this within a week, please call scheduling at [phone]).  If you are unable to make your appointment for clinic or an overnight study, kindly call the office or sleep testing center at least 48 hours in advance to cancel and reschedule.  If you are on CPAP, please bring your device's card and/or the device to each clinic appointment.   In case of problems with PAP machine or mask interface, please contact your [employer] (Durable Medical Equipment) company first. [employer] is the company who provides you the machine and/or PAP supplies.       PRESCRIPTIONS     We require 7 days advanced notice for prescription refills. If we do not receive the request in this time, we cannot guarantee that your medication will be refilled in time.    IMPORTANT PHONE NUMBERS     Sleep Medicine Clinic Fax: [phone]  Appointments (for Adult Sleep Clinic): [phone] - option 2  Appointments (For Sleep Studies): [phone] - option 3  Behavioral Sleep Medicine: [phone]  Sleep Surgery: [phone]  Nutrition Service: [phone]  Weight management clinics with endocrinology: [phone]  Bariatric Services: [phone] (includes weight loss medications and weight loss surgery)  [facility]: [phone] (offers holistic approaches to weight management)  ENT (Otolaryngology): [phone]  Headache Clinic (Neurology): [phone]  Neurology: [phone]  Psychiatry: [phone]  Pulmonary Function Testing (PFT) Center: [phone]  Pulmonary Medicine: [phone]  Medical Service Company (DME): [phone]      OUR SLEEP TESTING LOCATIONS     Our  "team will contact you to schedule your sleep study, however, you can contact us as follow:  Main Phone Line (sleep study scheduling only): [phone]), option 3  Adult Only Locations:  [facility] (18 years and older) at [place]: [address] [facility]  After hours line: [phone]    [place] (18 years and older): [address] [facility], 2nd floor   [place] (18 years and older): [address] [facility]; 4th floor  After hours line: [phone]  Adult and Pediatric Locations  [facility] (6 years and older): [facility] - 4th floor ([address]) After hours line: [phone]   [place] (6 years and older): [address]; [address]; [address]   [place] (6 years and older): [address] [facility], [address]  After hours line: [phone]  [facility] at [facility] (Main campus: All ages): [facility], 6th floor. After hours line: [phone]   [place] (5 years and older; younger considered on case-by-case basis): [address]; [address], [address]. Scheduling  After hours line: [phone]   [place] (13 years and older) in [place]: [address], 2nd floor  After hours line: [phone]  Critical access hospital (13 year and older): [address]  After hours line: [phone]       CONTACTING YOUR SLEEP MEDICINE PROVIDER AND SLEEP TEAM      For issues with your machine or mask interface, please call your DME provider first. DME stands for durable medical company. DME is the company who provides you the machine and/or PAP supplies / accessories.   To schedule, cancel, or reschedule SLEEP STUDY APPOINTMENTS, please call the Main Phone Line at [phone] - option 3.   To schedule, cancel, or reschedule CLINIC APPOINTMENTS, you can do it in \"MyChart\", call [phone] (direct line of [place] clinic), call [phone] (direct line of [place] clinic), or call the Main Phone Line at " "[phone]) - option 2  For CLINICAL QUESTIONS or MEDICATION REFILLS, please call direct line for Adult Sleep Nurses at [phone].   Lastly, you can also send a message directly to your provider through \"My Chart\", which is the email service through your  Records Account: [email or web address]       Here at [facility], we wish you a restful sleep!      This medical note was created with the assistance of artificial intelligence (AI) for documentation purposes. The content has been reviewed and confirmed by the healthcare provider for accuracy and completeness. Patient consented to the use of audio recording and use of AI during their visit.      "

## 2025-05-14 NOTE — PATIENT INSTRUCTIONS
Thank you for coming to the Sleep Medicine Clinic today! Your sleep medicine provider today was: Keaton Soto MD Below is a summary of your treatment plan, patient education, other important information, and our contact numbers.      TREATMENT PLAN     Follow-up Appointment:   Follow-up in 31-90 days after getting new machine.    PATIENT EDUCATION     You can also go to the following EDUCATION WEBSITES for further information:   American Academy of Sleep Medicine http://sleepeducation.org  National Sleep Foundation: https://sleepfoundation.org  American Sleep Apnea Association: https://www.sleepapnea.org (for patients with sleep apnea)  Narcolepsy Network: https://www.narcolepsynetwork.org (for patients with narcolepsy)  [employer]: https://www.wakeNutraboltcolepsy.org (for patients with narcolepsy)  Hypersomnia Foundation: https://www.hypersomniafoundation.org (for patients with idiopathic hypersomnia)  RLS foundation: https://www.rls.org (for patients with restless leg syndrome)    IMPORTANT INFORMATION     Call 911 for medical emergencies.  Our offices are generally open from Monday-Friday, 8 am - 5 pm.   There are no supporting services by either the sleep doctors or their staff on weekends and Holidays, or after 5 PM on weekdays.   If you need to get in touch with me, you may either call my office number or you can use [employer].  If a referral for a test, for CPAP, or for another specialist was made, and you have not heard about scheduling this within a week, please call scheduling at [phone]).  If you are unable to make your appointment for clinic or an overnight study, kindly call the office or sleep testing center at least 48 hours in advance to cancel and reschedule.  If you are on CPAP, please bring your device's card and/or the device to each clinic appointment.   In case of problems with PAP machine or mask interface, please contact your [employer] (Durable Medical Equipment) company first.  DME is the company who provides you the machine and/or PAP supplies.       PRESCRIPTIONS     We require 7 days advanced notice for prescription refills. If we do not receive the request in this time, we cannot guarantee that your medication will be refilled in time.    IMPORTANT PHONE NUMBERS     Sleep Medicine Clinic Fax: [phone]  Appointments (for Adult Sleep Clinic): [phone] - option 2  Appointments (For Sleep Studies): [phone] - option 3  Behavioral Sleep Medicine: [phone]  Sleep Surgery: [phone]  Nutrition Service: [phone]  Weight management clinics with endocrinology: [phone]  Bariatric Services: [phone] (includes weight loss medications and weight loss surgery)  Critical access hospital Network: [phone] (offers holistic approaches to weight management)  ENT (Otolaryngology): [phone]  Headache Clinic (Neurology): [phone]  Neurology: [phone]  Psychiatry: [phone]  Pulmonary Function Testing (PFT) Center: [phone]  Pulmonary Medicine: [phone]  Medical Service [employer] ([employer]): [phone]      OUR SLEEP TESTING LOCATIONS     Our team will contact you to schedule your sleep study, however, you can contact us as follow:  Main Phone Line (sleep study scheduling only): [phone]), option 3  Adult Only Locations:  [facility] (18 years and older) at [place]: [address] [address]  After hours line: [phone]    [place] (18 years and older): [address] [place] [address], 2nd floor   [place] (18 years and older): [address] [facility]; 4th floor  After hours line: [phone]  Adult and Pediatric Locations  [facility] (6 years and older): [facility] - 4th floor ([address] [facility]) After hours line: [phone]   [place] (6 years and older): [address]; [address]; [address]   [place] (6 years and older): [address] [facility], [address]  After hours line: [phone]    "[facility] (Main campus: All ages): [place], 6th floor. After hours line: [phone]   [place] (5 years and older; younger considered on case-by-case basis): [address]; [employer] [address], [address]. Scheduling  After hours line: [phone]   [place] (13 years and older) in [place]: [address], 2nd floor  After hours line: [phone]   [place] (13 year and older): [address]  After hours line: [phone]       CONTACTING YOUR SLEEP MEDICINE PROVIDER AND SLEEP TEAM      For issues with your machine or mask interface, please call your DME provider first. DME stands for durable medical company. DME is the company who provides you the machine and/or PAP supplies / accessories.   To schedule, cancel, or reschedule SLEEP STUDY APPOINTMENTS, please call the Main Phone Line at [phone] - option 3.   To schedule, cancel, or reschedule CLINIC APPOINTMENTS, you can do it in \"MyChart\", call [phone] (direct line of [place] clinic), call [phone] (direct line of [facility] clinic), or call the Main Phone Line at [phone] - option 2  For CLINICAL QUESTIONS or MEDICATION REFILLS, please call direct line for Adult Sleep Nurses at [phone].   Lastly, you can also send a message directly to your provider through \"My Chart\", which is the email service through your  Records Account: [email or web address]       Here at [facility], we wish you a restful sleep!   "

## 2025-06-24 ENCOUNTER — APPOINTMENT (OUTPATIENT)
Dept: UROLOGY | Facility: CLINIC | Age: 75
End: 2025-06-24
Payer: MEDICARE

## 2025-06-24 DIAGNOSIS — N20.1 URETEROLITHIASIS: Primary | ICD-10-CM

## 2025-06-24 DIAGNOSIS — N39.41 URGE INCONTINENCE OF URINE: ICD-10-CM

## 2025-06-24 PROCEDURE — 1159F MED LIST DOCD IN RCRD: CPT | Performed by: SURGERY

## 2025-06-24 PROCEDURE — 99203 OFFICE O/P NEW LOW 30 MIN: CPT | Performed by: SURGERY

## 2025-06-24 PROCEDURE — 1125F AMNT PAIN NOTED PAIN PRSNT: CPT | Performed by: SURGERY

## 2025-06-24 PROCEDURE — 1036F TOBACCO NON-USER: CPT | Performed by: SURGERY

## 2025-06-24 PROCEDURE — 1160F RVW MEDS BY RX/DR IN RCRD: CPT | Performed by: SURGERY

## 2025-06-24 ASSESSMENT — PAIN SCALES - GENERAL: PAINLEVEL_OUTOF10: 6

## 2025-06-24 NOTE — PROGRESS NOTES
Subjective   Patient ID: Elma Celestin is a 75 y.o. female who presents for Establish Care.    HPI  3 months ago she was in the ER with acute left-sided abdominal pain.  She underwent a workup which included a CT scan.  This revealed mild left hydronephrosis and hydroureter.  No stones were identified.  She was discharged home.  Since then her pain has resolved.  She has no prior history of stone disease.  In addition to this she has irritative voiding symptoms.  Over the past 6 to 12 months she has had urgency with urge incontinence.  She does wear 1 pad per day.  She also reports nocturia.  She is a former smoker, she quit 16 years ago.            Review of Systems  As per HPI, remaining 10 systems negative          Objective   Physical Exam  Well nourished  Breathing comfortably  Abdomen soft, ND, NT                  Assessment/Plan   Problem List Items Addressed This Visit    None  Visit Diagnoses         Codes      Ureterolithiasis    -  Primary N20.1    Relevant Orders    XR abdomen 1 view    US renal complete      Urge incontinence of urine     N39.41    Relevant Orders    Urinalysis with Reflex Microscopic            Ureteral stone.  I reviewed her CT abdomen and pelvis.  The distal ureters are difficult to visualize due to previous hip replacements.  She may have passed a small ureteral stone.  I will have her get a renal ultrasound to ensure the hydronephrosis has resolved.  I will call her with the results.    Urge incontinence.  We discussed her irritative voiding symptoms.  We will send the urine for microscopic analysis.  I counseled her on lifestyle modification including limiting caffeine intake, and limiting evening fluids.  I advised her to follow-up if her symptoms did not improve.               Rolly Tang MD 06/24/25 1:37 PM

## 2025-06-25 LAB
APPEARANCE UR: CLEAR
BACTERIA #/AREA URNS HPF: ABNORMAL /HPF
BILIRUB UR QL STRIP: NEGATIVE
COLOR UR: YELLOW
GLUCOSE UR QL STRIP: NEGATIVE
HGB UR QL STRIP: NEGATIVE
HYALINE CASTS #/AREA URNS LPF: ABNORMAL /LPF
KETONES UR QL STRIP: NEGATIVE
LEUKOCYTE ESTERASE UR QL STRIP: ABNORMAL
NITRITE UR QL STRIP: NEGATIVE
PH UR STRIP: 6 [PH] (ref 5–8)
PROT UR QL STRIP: NEGATIVE
RBC #/AREA URNS HPF: ABNORMAL /HPF
SERVICE CMNT-IMP: ABNORMAL
SP GR UR STRIP: 1.01 (ref 1–1.03)
SQUAMOUS #/AREA URNS HPF: ABNORMAL /HPF
WBC #/AREA URNS HPF: ABNORMAL /HPF

## 2025-06-29 ENCOUNTER — HOSPITAL ENCOUNTER (OUTPATIENT)
Dept: RADIOLOGY | Facility: HOSPITAL | Age: 75
Discharge: HOME | End: 2025-06-29
Payer: MEDICARE

## 2025-06-29 DIAGNOSIS — N20.1 URETEROLITHIASIS: ICD-10-CM

## 2025-06-29 PROCEDURE — 76770 US EXAM ABDO BACK WALL COMP: CPT

## 2025-06-29 PROCEDURE — 76770 US EXAM ABDO BACK WALL COMP: CPT | Performed by: RADIOLOGY

## 2025-07-01 ENCOUNTER — TELEPHONE (OUTPATIENT)
Dept: UROLOGY | Facility: CLINIC | Age: 75
End: 2025-07-01
Payer: MEDICARE

## 2025-07-10 ENCOUNTER — APPOINTMENT (OUTPATIENT)
Dept: SLEEP MEDICINE | Facility: CLINIC | Age: 75
End: 2025-07-10
Payer: MEDICARE

## 2025-07-14 ENCOUNTER — APPOINTMENT (OUTPATIENT)
Dept: PRIMARY CARE | Facility: CLINIC | Age: 75
End: 2025-07-14
Payer: MEDICARE

## 2025-07-14 VITALS
BODY MASS INDEX: 24.8 KG/M2 | OXYGEN SATURATION: 98 % | SYSTOLIC BLOOD PRESSURE: 130 MMHG | HEART RATE: 70 BPM | DIASTOLIC BLOOD PRESSURE: 82 MMHG | WEIGHT: 140 LBS

## 2025-07-14 DIAGNOSIS — G62.9 PERIPHERAL POLYNEUROPATHY: Primary | ICD-10-CM

## 2025-07-14 DIAGNOSIS — E78.5 DYSLIPIDEMIA: ICD-10-CM

## 2025-07-14 DIAGNOSIS — R41.89 ALTERATION IN COGNITION: ICD-10-CM

## 2025-07-14 DIAGNOSIS — R35.0 FREQUENT URINATION: ICD-10-CM

## 2025-07-14 DIAGNOSIS — R73.03 PRE-DIABETES: ICD-10-CM

## 2025-07-14 DIAGNOSIS — M25.561 ACUTE PAIN OF RIGHT KNEE: ICD-10-CM

## 2025-07-14 DIAGNOSIS — S00.06XA TICK BITE OF SCALP, INITIAL ENCOUNTER: ICD-10-CM
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
## 2025-07-14 PROCEDURE — 1036F TOBACCO NON-USER: CPT | Performed by: FAMILY MEDICINE

## 2025-07-14 PROCEDURE — 99214 OFFICE O/P EST MOD 30 MIN: CPT | Performed by: FAMILY MEDICINE

## 2025-07-14 PROCEDURE — G2211 COMPLEX E/M VISIT ADD ON: HCPCS | Performed by: FAMILY MEDICINE

## 2025-07-14 PROCEDURE — 1159F MED LIST DOCD IN RCRD: CPT | Performed by: FAMILY MEDICINE

## 2025-07-14 RX ORDER — DOXYCYCLINE 100 MG/1
100 CAPSULE ORAL 2 TIMES DAILY
Qty: 42 CAPSULE | Refills: 0 | Status: SHIPPED | OUTPATIENT
Start: 2025-07-14 | End: 2025-08-04

## 2025-07-14 ASSESSMENT — PATIENT HEALTH QUESTIONNAIRE - PHQ9
2. FEELING DOWN, DEPRESSED OR HOPELESS: NOT AT ALL
1. LITTLE INTEREST OR PLEASURE IN DOING THINGS: NOT AT ALL
SUM OF ALL RESPONSES TO PHQ9 QUESTIONS 1 AND 2: 0

## 2025-07-14 NOTE — PATIENT INSTRUCTIONS
Please follow up with Dr Huitron for the knee pains that were exacerbated by the falls.     That was a tick you pulled off , and I sent in the treatment for doxycyline twice per day for 3 weeks.     Continue follow up with ENT, Dr Pulido , for the thyroid cysts/nodules.  Follow up with him in November     Please get updated fasting blood work to recheck the lipid and thyroid and A1c    Continue the rosuvastatin 5 mg for now.     Continue to follow up with your specialists, rheumatology, pancreatic, lung, dermatologist, GI, ENT, etc    Order for MRI of the right knee since this continued to be a problem and exacerbated by a fall .   Continue to wear the brace     Continue regular eye and dental examinations     Continue the wellbutrin     Follow up in march 2026 as scheduled

## 2025-07-14 NOTE — PROGRESS NOTES
Subjective   Elma Celestin is a 75 y.o. female who presents for Fall (Patient has had multiple falls in last few months/Patient injured right knee 7 months - fall caused her to re injured knee/Patient had tick on her 1 week ago).    Fall      #) Fall   - several in the last few months  - right knee pain  - no pre-syncope   - 2 falls, the 2nd was tripping   - no LOC or head trauma   - fell on the knees, right knee reinjured with 2nd fall  - wearing a knee brace, 5/10 pain     #) peripheral neuropathy   - left hand and bilateral feet  - will check B12 and folate and A1c     #) tick bite - had about 1 week ago   - no fever, some chills     #) Forgetful and more irritable.   - is care taking for her      #) Some sinus drainage leading to cough   - doing sinus rinse in the past   - also using nasal saline gel     #) moderate to severe obstructive sleep apnea with oxygen levels dropping to 78% , - referral to Dr Soto , sleep med  - Sleep test on 4/1/25   - Nocturia   - she does snore   - does feel tired all the time.   - last year on a girls trip was told that she snores     #) HTN   - BP today is 134/80--> 130/82   - on Prn NSAIDS     #) GERD -on on famotidine   - EGD 8/8/24 showes slight gastritis   - small hiatal hernia seen on MRCP in nov 2024     #) HLD   - ASCVD risk is 20%  - LDL was 140 in the past, will recheck   - CT calcium score was last on 10/14/19 as a score of 1.0   - not on a statin at this time, but I did recommend it     #) varicose veins     #) Pre diabetes   - A1c was 5.7% on 6/18/24  - uncles and grandmother with diabetes   - up 5# since last visit.     #) nontoxic goiter- then found to have papillary thyroid carcinoma   - partial thyroidectomy   - follows with Dr Pulido , ENT with yearly follow up   - last US was on 9/5/24 >   Last in our system was on 3/18/22- solid nodule measuring up to 1.6 cm. Fine-needle  aspiration of this should be considered. Otherwise follow-up necessary.     #)  pancreatic cyst with liver hemangioma   - follows with Emily Chan   - last MRCP pancreas on 11/1/24 -- Increase in size of pancreatic cystic lesions suggestive of side duct IPMN. Recommend 1 year follow-up with pancreas MRI/MRCP.    #) chronic constipation - BM are ok   Some urinary incontinence   - Did see Dr Miguel with EGD and colonoscopy on 8/8/24   - did 3 weeks of omeprazole 40 mg and then transitioned to famotidine     #) Chronic low back pain and generalized OA - right knee pain   - h/o bilateral hip replacements   - had seen Dr Huitron, and then falls made it worse   - right knee xray with dr Huitron - did get a shot on 2/10/25 and brace     #) lung nodules with centrilobular emphysema   - quit smoking about 16 yrs ago   - last CT lung screen on 2/27/25     #) nevus  - regular skin checks with Demetri matamoros     #) Hearing testing last on 10/20/23 - no need for hearing aids     #) Cataracts     #) osteoporosis   - DEXA last on 12/2/24   - follows with Dr Wilder   - on prolia , calcium and vitamin D     #) Kidney stones, in June 2025   - did see Dr Tang     #) Stress/ anxiety   -  has had multiple knee surgeries   - on bupropion  mg q am - only for a week or so     ROS was completed and all systems are negative with the exception of what was noted in the the HPI.     Objective     /82   Pulse 70   Wt 63.5 kg (140 lb)   SpO2 98%   BMI 24.80 kg/m²      Last Labs:     CMP:   Lab Results   Component Value Date    CALCIUM 9.1 03/27/2025    CALCIUM 9.0 10/10/2024    PROT 7.4 03/27/2025    PROT 6.6 10/02/2024    ALBUMIN 4.4 03/27/2025    ALBUMIN 3.9 10/02/2024    AST 20 03/27/2025    AST 20 10/02/2024    ALKPHOS 47 03/27/2025    ALKPHOS 49 10/02/2024    BILITOT 0.5 03/27/2025    BILITOT 0.4 10/02/2024     CBC:   Lab Results   Component Value Date    WBC 10.5 03/27/2025    WBC 6.2 03/08/2025    WBC 5.6 02/01/2024    HGB 13.7 03/27/2025    HGB 13.1 03/08/2025    HGB 13.2 02/01/2024    HCT 41.8  "03/27/2025    HCT 41.4 03/08/2025    HCT 41.7 02/01/2024    MCV 92 03/27/2025    MCV 94.1 03/08/2025    MCV 94 02/01/2024     03/27/2025     03/08/2025     02/01/2024     A1C:   Lab Results   Component Value Date    HGBA1C 5.9 (H) 03/08/2025    HGBA1C 5.7 06/18/2024    HGBA1C 5.7 (A) 06/11/2022    HGBA1C 5.8 06/19/2020     LIPID PANEL:   Lab Results   Component Value Date    CHOL 227 (H) 03/08/2025    CHOL 224 (H) 02/01/2024    CHOL 230 (H) 12/02/2022    CHOL 224 (H) 10/25/2021    TRIG 66 03/08/2025    TRIG 153 (H) 02/01/2024    TRIG 84 12/02/2022    TRIG 145 10/25/2021    HDL 81 03/08/2025    HDL 68.6 02/01/2024    HDL 73.6 12/02/2022    HDL 69.9 10/25/2021    CHHDL 2.8 03/08/2025    CHHDL 3.3 02/01/2024    CHHDL 3.1 12/02/2022    CHHDL 3.2 10/25/2021    LDLF 140 (H) 12/02/2022    LDLF 125 (H) 10/25/2021    VLDL 31 02/01/2024    VLDL 17 12/02/2022    VLDL 29 10/25/2021    NHDL 146 (H) 03/08/2025    NHDL 155 (H) 02/01/2024     TSH:   Lab Results   Component Value Date    TSH 1.01 10/02/2024    TSH 1.18 02/01/2024     PSA:   No results found for: \"PSA\"    Physical Exam  GEN: A+O, no acute distress  HEENT: NC/AT, Oropharynx clear, no exudates, TM visualized, Extraoccular muscles intact, no facial droop; no thyromegaly or cervical LAD  RESP: CTAB, no wheezes   CV: RRR, no murmurs  ABD: soft, non-tender, + BS  SKIN: no rashes or bruising, no peripheral edema   NEURO: CN II-XII grossly intact, moves all extremities equally, no tremor   PSYCH: normal affect, appropriate mood     Assessment/Plan   Problem List Items Addressed This Visit    None  Please follow up with Dr Huitron for the knee pains that were exacerbated by the falls.     That was a tick you pulled off , and I sent in the treatment for doxycyline twice per day for 3 weeks.     Continue follow up with ENT, Dr Pulido , for the thyroid cysts/nodules.  Follow up with him in November     Please get updated fasting blood work to recheck the lipid " and thyroid and A1c    Continue the rosuvastatin 5 mg for now.     Continue to follow up with your specialists, rheumatology, pancreatic, lung, dermatologist, GI, ENT, etc    Order for MRI of the right knee since this continued to be a problem and exacerbated by a fall .   Continue to wear the brace     Continue regular eye and dental examinations     Continue the wellbutrin     Follow up in march 2026 as scheduled        Laila Alonzo DO, [facility] [place], [place]  [address]   [address]   [place] [address]  Ph. [phone]  Fx. [phone]

## 2025-07-16 LAB — HELMINTH+ARTHROPOD IDENTIFIED IN SPECIMEN: ABNORMAL

## 2025-07-17 LAB
APPEARANCE UR: CLEAR
BACTERIA #/AREA URNS HPF: ABNORMAL /HPF
BACTERIA UR CULT: NORMAL
BILIRUB UR QL STRIP: NEGATIVE
CHOLEST SERPL-MCNC: 220 MG/DL
CHOLEST/HDLC SERPL: 2.9 (CALC)
COLOR UR: YELLOW
EST. AVERAGE GLUCOSE BLD GHB EST-MCNC: 123 MG/DL
EST. AVERAGE GLUCOSE BLD GHB EST-SCNC: 6.8 MMOL/L
FOLATE SERPL-MCNC: 21.6 NG/ML
GLUCOSE UR QL STRIP: NEGATIVE
HBA1C MFR BLD: 5.9 %
HDLC SERPL-MCNC: 75 MG/DL
HGB UR QL STRIP: NEGATIVE
HYALINE CASTS #/AREA URNS LPF: ABNORMAL /LPF
KETONES UR QL STRIP: NEGATIVE
LDLC SERPL CALC-MCNC: 121 MG/DL (CALC)
LEUKOCYTE ESTERASE UR QL STRIP: ABNORMAL
MAGNESIUM SERPL-MCNC: 2.2 MG/DL (ref 1.5–2.5)
NITRITE UR QL STRIP: NEGATIVE
NONHDLC SERPL-MCNC: 145 MG/DL (CALC)
PH UR STRIP: 7 [PH] (ref 5–8)
PROT UR QL STRIP: NEGATIVE
RBC #/AREA URNS HPF: ABNORMAL /HPF
SERVICE CMNT-IMP: ABNORMAL
SP GR UR STRIP: 1.02 (ref 1–1.03)
SQUAMOUS #/AREA URNS HPF: ABNORMAL /HPF
TRIGL SERPL-MCNC: 127 MG/DL
VIT B12 SERPL-MCNC: 535 PG/ML (ref 200–1100)
WBC #/AREA URNS HPF: ABNORMAL /HPF

## 2025-07-25 ENCOUNTER — TELEPHONE (OUTPATIENT)
Dept: PRIMARY CARE | Facility: CLINIC | Age: 75
End: 2025-07-25
Payer: MEDICARE

## 2025-07-25 NOTE — TELEPHONE ENCOUNTER
Asking if she should be concerned about the leukocytes were abnormal. As far as the [place] fever tick she's been taking the antibiotic but still having muscle pains and urinating a lot more.  She thinks she's developing a yeast infection. Pls call back

## 2025-07-27 ENCOUNTER — HOSPITAL ENCOUNTER (OUTPATIENT)
Dept: RADIOLOGY | Facility: HOSPITAL | Age: 75
Discharge: HOME | End: 2025-07-27
Payer: MEDICARE

## 2025-07-27 DIAGNOSIS — M25.561 ACUTE PAIN OF RIGHT KNEE: ICD-10-CM

## 2025-07-27 DIAGNOSIS — T36.95XA ANTIBIOTIC-INDUCED YEAST INFECTION: Primary | ICD-10-CM

## 2025-07-27 DIAGNOSIS — B37.9 ANTIBIOTIC-INDUCED YEAST INFECTION: Primary | ICD-10-CM

## 2025-07-27 PROCEDURE — 73721 MRI JNT OF LWR EXTRE W/O DYE: CPT | Mod: RT

## 2025-07-27 PROCEDURE — 73721 MRI JNT OF LWR EXTRE W/O DYE: CPT | Mod: RIGHT SIDE | Performed by: RADIOLOGY

## 2025-07-27 RX ORDER — FLUCONAZOLE 150 MG/1
150 TABLET ORAL
Qty: 3 TABLET | Refills: 0 | Status: SHIPPED | OUTPATIENT
Start: 2025-07-27

## 2025-07-29 DIAGNOSIS — L08.9: Primary | ICD-10-CM

## 2025-07-29 DIAGNOSIS — S40.862S: Primary | ICD-10-CM
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
## 2025-08-14 ENCOUNTER — APPOINTMENT (OUTPATIENT)
Dept: SLEEP MEDICINE | Facility: CLINIC | Age: 75
End: 2025-08-14
Payer: MEDICARE

## 2025-08-21 DIAGNOSIS — G89.29 CHRONIC PAIN OF RIGHT KNEE: Primary | ICD-10-CM

## 2025-08-21 DIAGNOSIS — M25.561 CHRONIC PAIN OF RIGHT KNEE: Primary | ICD-10-CM

## 2025-08-25 ENCOUNTER — APPOINTMENT (OUTPATIENT)
Dept: SLEEP MEDICINE | Facility: CLINIC | Age: 75
End: 2025-08-25
Payer: MEDICARE

## 2025-08-25 VITALS
OXYGEN SATURATION: 98 % | HEIGHT: 63 IN | WEIGHT: 138 LBS | BODY MASS INDEX: 24.45 KG/M2 | HEART RATE: 76 BPM | SYSTOLIC BLOOD PRESSURE: 129 MMHG | DIASTOLIC BLOOD PRESSURE: 85 MMHG

## 2025-08-25 DIAGNOSIS — G47.33 OBSTRUCTIVE SLEEP APNEA (ADULT) (PEDIATRIC): ICD-10-CM

## 2025-08-25 PROCEDURE — 1159F MED LIST DOCD IN RCRD: CPT | Performed by: INTERNAL MEDICINE

## 2025-08-25 PROCEDURE — 1126F AMNT PAIN NOTED NONE PRSNT: CPT | Performed by: INTERNAL MEDICINE

## 2025-08-25 PROCEDURE — 1160F RVW MEDS BY RX/DR IN RCRD: CPT | Performed by: INTERNAL MEDICINE

## 2025-08-25 PROCEDURE — G2211 COMPLEX E/M VISIT ADD ON: HCPCS | Performed by: INTERNAL MEDICINE

## 2025-08-25 PROCEDURE — 99214 OFFICE O/P EST MOD 30 MIN: CPT | Performed by: INTERNAL MEDICINE

## 2025-08-25 ASSESSMENT — SLEEP AND FATIGUE QUESTIONNAIRES
HOW LIKELY ARE YOU TO NOD OFF OR FALL ASLEEP IN A CAR, WHILE STOPPED FOR A FEW MINUTES IN TRAFFIC: WOULD NEVER DOZE
SLEEP_PROBLEM_INTERFERES_DAILY_ACTIVITIES: A LITTLE
DIFFICULTY_FALLING_ASLEEP: SEVERE
WORRIED_DISTRESSED_DUE_TO_SLEEP: A LITTLE
SITING INACTIVE IN A PUBLIC PLACE LIKE A CLASS ROOM OR A MOVIE THEATER: WOULD NEVER DOZE
HOW LIKELY ARE YOU TO NOD OFF OR FALL ASLEEP WHILE WATCHING TV: WOULD NEVER DOZE
DIFFICULTY_STAYING_ASLEEP: SEVERE
HOW LIKELY ARE YOU TO NOD OFF OR FALL ASLEEP WHILE LYING DOWN TO REST IN THE AFTERNOON WHEN CIRCUMSTANCES PERMIT: WOULD NEVER DOZE
ESS-CHAD TOTAL SCORE: 0
HOW LIKELY ARE YOU TO NOD OFF OR FALL ASLEEP WHILE SITTING AND READING: WOULD NEVER DOZE
SLEEP_PROBLEM_NOTICEABLE_TO_OTHERS: A LITTLE
HOW LIKELY ARE YOU TO NOD OFF OR FALL ASLEEP WHEN YOU ARE A PASSENGER IN A CAR FOR AN HOUR WITHOUT A BREAK: WOULD NEVER DOZE
HOW LIKELY ARE YOU TO NOD OFF OR FALL ASLEEP WHILE SITTING QUIETLY AFTER LUNCH WITHOUT ALCOHOL: WOULD NEVER DOZE
HOW LIKELY ARE YOU TO NOD OFF OR FALL ASLEEP WHILE SITTING AND TALKING TO SOMEONE: WOULD NEVER DOZE
SATISFACTION_WITH_CURRENT_SLEEP_PATTERN: SATISFIED

## 2025-08-25 ASSESSMENT — PAIN SCALES - GENERAL: PAINLEVEL_OUTOF10: 0-NO PAIN

## 2025-08-25 ASSESSMENT — LIFESTYLE VARIABLES
HOW OFTEN DO YOU HAVE SIX OR MORE DRINKS ON ONE OCCASION: NEVER
AUDIT-C TOTAL SCORE: 0
HOW MANY STANDARD DRINKS CONTAINING ALCOHOL DO YOU HAVE ON A TYPICAL DAY: PATIENT DOES NOT DRINK
HOW OFTEN DO YOU HAVE A DRINK CONTAINING ALCOHOL: NEVER
SKIP TO QUESTIONS 9-10: 1

## 2025-08-27 ENCOUNTER — TELEPHONE (OUTPATIENT)
Dept: SLEEP MEDICINE | Facility: CLINIC | Age: 75
End: 2025-08-27
Payer: MEDICARE

## 2025-09-12 ENCOUNTER — APPOINTMENT (OUTPATIENT)
Dept: DERMATOLOGY | Facility: CLINIC | Age: 75
End: 2025-09-12
Payer: MEDICARE

## 2025-10-30 ENCOUNTER — APPOINTMENT (OUTPATIENT)
Dept: INFUSION THERAPY | Facility: CLINIC | Age: 75
End: 2025-10-30
Payer: MEDICARE

## 2025-12-01 ENCOUNTER — APPOINTMENT (OUTPATIENT)
Dept: SLEEP MEDICINE | Facility: CLINIC | Age: 75
End: 2025-12-01
Payer: MEDICARE

## 2026-03-03 ENCOUNTER — APPOINTMENT (OUTPATIENT)
Dept: PRIMARY CARE | Facility: CLINIC | Age: 76
End: 2026-03-03
Payer: MEDICARE

## 2026-05-12 ENCOUNTER — APPOINTMENT (OUTPATIENT)
Dept: OPHTHALMOLOGY | Facility: CLINIC | Age: 76
End: 2026-05-12
Payer: MEDICARE